# Patient Record
Sex: FEMALE | Race: OTHER | HISPANIC OR LATINO
[De-identification: names, ages, dates, MRNs, and addresses within clinical notes are randomized per-mention and may not be internally consistent; named-entity substitution may affect disease eponyms.]

---

## 2019-10-28 PROBLEM — Z00.00 ENCOUNTER FOR PREVENTIVE HEALTH EXAMINATION: Status: ACTIVE | Noted: 2019-10-28

## 2019-10-29 ENCOUNTER — APPOINTMENT (OUTPATIENT)
Dept: OBGYN | Facility: CLINIC | Age: 29
End: 2019-10-29

## 2021-01-04 ENCOUNTER — NON-APPOINTMENT (OUTPATIENT)
Age: 31
End: 2021-01-04

## 2021-01-04 ENCOUNTER — APPOINTMENT (OUTPATIENT)
Dept: OBGYN | Facility: CLINIC | Age: 31
End: 2021-01-04
Payer: MEDICAID

## 2021-01-04 VITALS — DIASTOLIC BLOOD PRESSURE: 72 MMHG | WEIGHT: 114 LBS | SYSTOLIC BLOOD PRESSURE: 118 MMHG | BODY MASS INDEX: 20.19 KG/M2

## 2021-01-04 VITALS — HEIGHT: 63 IN

## 2021-01-04 PROCEDURE — 99072 ADDL SUPL MATRL&STAF TM PHE: CPT

## 2021-01-04 PROCEDURE — 99203 OFFICE O/P NEW LOW 30 MIN: CPT | Mod: TH

## 2021-01-28 ENCOUNTER — NON-APPOINTMENT (OUTPATIENT)
Age: 31
End: 2021-01-28

## 2021-02-04 ENCOUNTER — LABORATORY RESULT (OUTPATIENT)
Age: 31
End: 2021-02-04

## 2021-02-05 ENCOUNTER — NON-APPOINTMENT (OUTPATIENT)
Age: 31
End: 2021-02-05

## 2021-02-05 ENCOUNTER — APPOINTMENT (OUTPATIENT)
Dept: OBGYN | Facility: CLINIC | Age: 31
End: 2021-02-05
Payer: MEDICAID

## 2021-02-05 VITALS — DIASTOLIC BLOOD PRESSURE: 76 MMHG | WEIGHT: 116 LBS | SYSTOLIC BLOOD PRESSURE: 114 MMHG | BODY MASS INDEX: 20.55 KG/M2

## 2021-02-05 PROCEDURE — 99072 ADDL SUPL MATRL&STAF TM PHE: CPT

## 2021-02-05 PROCEDURE — 99213 OFFICE O/P EST LOW 20 MIN: CPT | Mod: TH

## 2021-02-08 ENCOUNTER — NON-APPOINTMENT (OUTPATIENT)
Age: 31
End: 2021-02-08

## 2021-02-10 ENCOUNTER — APPOINTMENT (OUTPATIENT)
Dept: ANTEPARTUM | Facility: CLINIC | Age: 31
End: 2021-02-10
Payer: MEDICAID

## 2021-02-10 ENCOUNTER — APPOINTMENT (OUTPATIENT)
Dept: ANTEPARTUM | Facility: CLINIC | Age: 31
End: 2021-02-10

## 2021-02-10 ENCOUNTER — ASOB RESULT (OUTPATIENT)
Age: 31
End: 2021-02-10

## 2021-02-10 PROCEDURE — 76805 OB US >/= 14 WKS SNGL FETUS: CPT

## 2021-02-10 PROCEDURE — 99072 ADDL SUPL MATRL&STAF TM PHE: CPT

## 2021-02-17 ENCOUNTER — NON-APPOINTMENT (OUTPATIENT)
Age: 31
End: 2021-02-17

## 2021-02-19 ENCOUNTER — APPOINTMENT (OUTPATIENT)
Dept: OBGYN | Facility: CLINIC | Age: 31
End: 2021-02-19
Payer: MEDICAID

## 2021-02-19 ENCOUNTER — NON-APPOINTMENT (OUTPATIENT)
Age: 31
End: 2021-02-19

## 2021-02-19 VITALS — SYSTOLIC BLOOD PRESSURE: 110 MMHG | BODY MASS INDEX: 21.61 KG/M2 | DIASTOLIC BLOOD PRESSURE: 68 MMHG | WEIGHT: 122 LBS

## 2021-02-19 DIAGNOSIS — Z34.93 ENCOUNTER FOR SUPERVISION OF NORMAL PREGNANCY, UNSPECIFIED, THIRD TRIMESTER: ICD-10-CM

## 2021-02-19 PROCEDURE — 99072 ADDL SUPL MATRL&STAF TM PHE: CPT

## 2021-02-19 PROCEDURE — 99213 OFFICE O/P EST LOW 20 MIN: CPT | Mod: TH

## 2021-03-01 ENCOUNTER — LABORATORY RESULT (OUTPATIENT)
Age: 31
End: 2021-03-01

## 2021-03-02 ENCOUNTER — NON-APPOINTMENT (OUTPATIENT)
Age: 31
End: 2021-03-02

## 2021-03-05 ENCOUNTER — NON-APPOINTMENT (OUTPATIENT)
Age: 31
End: 2021-03-05

## 2021-03-05 ENCOUNTER — APPOINTMENT (OUTPATIENT)
Dept: OBGYN | Facility: CLINIC | Age: 31
End: 2021-03-05
Payer: MEDICAID

## 2021-03-05 VITALS — BODY MASS INDEX: 21.97 KG/M2 | DIASTOLIC BLOOD PRESSURE: 74 MMHG | SYSTOLIC BLOOD PRESSURE: 112 MMHG | WEIGHT: 124 LBS

## 2021-03-05 DIAGNOSIS — D64.9 ANEMIA, UNSPECIFIED: ICD-10-CM

## 2021-03-05 PROCEDURE — 99213 OFFICE O/P EST LOW 20 MIN: CPT | Mod: TH

## 2021-03-05 PROCEDURE — 99072 ADDL SUPL MATRL&STAF TM PHE: CPT

## 2021-03-10 ENCOUNTER — APPOINTMENT (OUTPATIENT)
Dept: ANTEPARTUM | Facility: CLINIC | Age: 31
End: 2021-03-10
Payer: MEDICAID

## 2021-03-10 ENCOUNTER — ASOB RESULT (OUTPATIENT)
Age: 31
End: 2021-03-10

## 2021-03-10 PROCEDURE — 76816 OB US FOLLOW-UP PER FETUS: CPT

## 2021-03-10 PROCEDURE — 99072 ADDL SUPL MATRL&STAF TM PHE: CPT

## 2021-03-16 ENCOUNTER — NON-APPOINTMENT (OUTPATIENT)
Age: 31
End: 2021-03-16

## 2021-03-19 ENCOUNTER — APPOINTMENT (OUTPATIENT)
Dept: OBGYN | Facility: CLINIC | Age: 31
End: 2021-03-19
Payer: MEDICAID

## 2021-03-19 ENCOUNTER — NON-APPOINTMENT (OUTPATIENT)
Age: 31
End: 2021-03-19

## 2021-03-19 VITALS — DIASTOLIC BLOOD PRESSURE: 82 MMHG | BODY MASS INDEX: 21.97 KG/M2 | WEIGHT: 124 LBS | SYSTOLIC BLOOD PRESSURE: 110 MMHG

## 2021-03-19 PROCEDURE — 99213 OFFICE O/P EST LOW 20 MIN: CPT | Mod: TH

## 2021-03-19 PROCEDURE — 99072 ADDL SUPL MATRL&STAF TM PHE: CPT

## 2021-04-01 ENCOUNTER — LABORATORY RESULT (OUTPATIENT)
Age: 31
End: 2021-04-01

## 2021-04-01 ENCOUNTER — NON-APPOINTMENT (OUTPATIENT)
Age: 31
End: 2021-04-01

## 2021-04-01 ENCOUNTER — APPOINTMENT (OUTPATIENT)
Dept: OBGYN | Facility: CLINIC | Age: 31
End: 2021-04-01
Payer: MEDICAID

## 2021-04-01 VITALS — BODY MASS INDEX: 22.14 KG/M2 | WEIGHT: 125 LBS | DIASTOLIC BLOOD PRESSURE: 68 MMHG | SYSTOLIC BLOOD PRESSURE: 112 MMHG

## 2021-04-01 PROCEDURE — 99213 OFFICE O/P EST LOW 20 MIN: CPT | Mod: TH

## 2021-04-01 PROCEDURE — 99072 ADDL SUPL MATRL&STAF TM PHE: CPT

## 2021-04-07 ENCOUNTER — NON-APPOINTMENT (OUTPATIENT)
Age: 31
End: 2021-04-07

## 2021-04-07 ENCOUNTER — APPOINTMENT (OUTPATIENT)
Dept: ANTEPARTUM | Facility: CLINIC | Age: 31
End: 2021-04-07
Payer: MEDICAID

## 2021-04-07 ENCOUNTER — ASOB RESULT (OUTPATIENT)
Age: 31
End: 2021-04-07

## 2021-04-07 VITALS
HEART RATE: 83 BPM | HEIGHT: 63 IN | BODY MASS INDEX: 22.32 KG/M2 | WEIGHT: 126 LBS | SYSTOLIC BLOOD PRESSURE: 112 MMHG | DIASTOLIC BLOOD PRESSURE: 76 MMHG

## 2021-04-07 PROCEDURE — 76820 UMBILICAL ARTERY ECHO: CPT

## 2021-04-07 PROCEDURE — 76816 OB US FOLLOW-UP PER FETUS: CPT

## 2021-04-07 PROCEDURE — 76819 FETAL BIOPHYS PROFIL W/O NST: CPT

## 2021-04-07 PROCEDURE — 99072 ADDL SUPL MATRL&STAF TM PHE: CPT

## 2021-04-09 ENCOUNTER — APPOINTMENT (OUTPATIENT)
Dept: ANTEPARTUM | Facility: CLINIC | Age: 31
End: 2021-04-09
Payer: MEDICAID

## 2021-04-09 ENCOUNTER — ASOB RESULT (OUTPATIENT)
Age: 31
End: 2021-04-09

## 2021-04-09 ENCOUNTER — NON-APPOINTMENT (OUTPATIENT)
Age: 31
End: 2021-04-09

## 2021-04-09 ENCOUNTER — APPOINTMENT (OUTPATIENT)
Dept: OBGYN | Facility: CLINIC | Age: 31
End: 2021-04-09
Payer: MEDICAID

## 2021-04-09 VITALS — BODY MASS INDEX: 22.67 KG/M2 | WEIGHT: 128 LBS

## 2021-04-09 PROCEDURE — 99213 OFFICE O/P EST LOW 20 MIN: CPT

## 2021-04-09 PROCEDURE — 76818 FETAL BIOPHYS PROFILE W/NST: CPT

## 2021-04-09 PROCEDURE — 99072 ADDL SUPL MATRL&STAF TM PHE: CPT

## 2021-04-13 ENCOUNTER — APPOINTMENT (OUTPATIENT)
Dept: ANTEPARTUM | Facility: CLINIC | Age: 31
End: 2021-04-13

## 2021-04-13 ENCOUNTER — ASOB RESULT (OUTPATIENT)
Age: 31
End: 2021-04-13

## 2021-04-13 ENCOUNTER — APPOINTMENT (OUTPATIENT)
Dept: ANTEPARTUM | Facility: CLINIC | Age: 31
End: 2021-04-13
Payer: MEDICAID

## 2021-04-13 PROCEDURE — 99072 ADDL SUPL MATRL&STAF TM PHE: CPT

## 2021-04-13 PROCEDURE — 76818 FETAL BIOPHYS PROFILE W/NST: CPT

## 2021-04-16 ENCOUNTER — ASOB RESULT (OUTPATIENT)
Age: 31
End: 2021-04-16

## 2021-04-16 ENCOUNTER — APPOINTMENT (OUTPATIENT)
Dept: ANTEPARTUM | Facility: CLINIC | Age: 31
End: 2021-04-16
Payer: MEDICAID

## 2021-04-16 PROCEDURE — 76820 UMBILICAL ARTERY ECHO: CPT

## 2021-04-16 PROCEDURE — 76819 FETAL BIOPHYS PROFIL W/O NST: CPT

## 2021-04-16 PROCEDURE — 99072 ADDL SUPL MATRL&STAF TM PHE: CPT

## 2021-04-19 ENCOUNTER — INPATIENT (INPATIENT)
Facility: HOSPITAL | Age: 31
LOS: 2 days | Discharge: HOME | End: 2021-04-22
Attending: OBSTETRICS & GYNECOLOGY | Admitting: OBSTETRICS & GYNECOLOGY
Payer: MEDICAID

## 2021-04-19 VITALS — WEIGHT: 127.87 LBS | HEIGHT: 63 IN

## 2021-04-19 DIAGNOSIS — O26.899 OTHER SPECIFIED PREGNANCY RELATED CONDITIONS, UNSPECIFIED TRIMESTER: ICD-10-CM

## 2021-04-19 DIAGNOSIS — Z3A.00 WEEKS OF GESTATION OF PREGNANCY NOT SPECIFIED: ICD-10-CM

## 2021-04-19 DIAGNOSIS — Z34.80 ENCOUNTER FOR SUPERVISION OF OTHER NORMAL PREGNANCY, UNSPECIFIED TRIMESTER: ICD-10-CM

## 2021-04-19 LAB
APTT BLD: 23.6 SEC — LOW (ref 27.5–35.5)
BASOPHILS # BLD AUTO: 0.08 K/UL — SIGNIFICANT CHANGE UP (ref 0–0.2)
BASOPHILS NFR BLD AUTO: 0.9 % — SIGNIFICANT CHANGE UP (ref 0–2)
BLD GP AB SCN SERPL QL: SIGNIFICANT CHANGE UP
COVID-19 SPIKE DOMAIN AB INTERP: NEGATIVE — SIGNIFICANT CHANGE UP
COVID-19 SPIKE DOMAIN ANTIBODY RESULT: 0.4 U/ML — SIGNIFICANT CHANGE UP
EOSINOPHIL # BLD AUTO: 0.32 K/UL — SIGNIFICANT CHANGE UP (ref 0–0.5)
EOSINOPHIL NFR BLD AUTO: 3.6 % — SIGNIFICANT CHANGE UP (ref 0–6)
FIBRINOGEN PPP-MCNC: 548 MG/DL — HIGH (ref 290–520)
HCT VFR BLD CALC: 36.9 % — SIGNIFICANT CHANGE UP (ref 34.5–45)
HGB BLD-MCNC: 12.5 G/DL — SIGNIFICANT CHANGE UP (ref 11.5–15.5)
IMM GRANULOCYTES NFR BLD AUTO: 0.8 % — SIGNIFICANT CHANGE UP (ref 0–1.5)
INR BLD: 0.98 RATIO — SIGNIFICANT CHANGE UP (ref 0.88–1.16)
LYMPHOCYTES # BLD AUTO: 1.87 K/UL — SIGNIFICANT CHANGE UP (ref 1–3.3)
LYMPHOCYTES # BLD AUTO: 20.9 % — SIGNIFICANT CHANGE UP (ref 13–44)
MCHC RBC-ENTMCNC: 29.9 PG — SIGNIFICANT CHANGE UP (ref 27–34)
MCHC RBC-ENTMCNC: 33.9 GM/DL — SIGNIFICANT CHANGE UP (ref 32–36)
MCV RBC AUTO: 88.3 FL — SIGNIFICANT CHANGE UP (ref 80–100)
MONOCYTES # BLD AUTO: 0.6 K/UL — SIGNIFICANT CHANGE UP (ref 0–0.9)
MONOCYTES NFR BLD AUTO: 6.7 % — SIGNIFICANT CHANGE UP (ref 2–14)
NEUTROPHILS # BLD AUTO: 6.01 K/UL — SIGNIFICANT CHANGE UP (ref 1.8–7.4)
NEUTROPHILS NFR BLD AUTO: 67.1 % — SIGNIFICANT CHANGE UP (ref 43–77)
NRBC # BLD: 0 /100 WBCS — SIGNIFICANT CHANGE UP (ref 0–0)
PLATELET # BLD AUTO: 275 K/UL — SIGNIFICANT CHANGE UP (ref 150–400)
PROTHROM AB SERPL-ACNC: 11.7 SEC — SIGNIFICANT CHANGE UP (ref 10.6–13.6)
RBC # BLD: 4.18 M/UL — SIGNIFICANT CHANGE UP (ref 3.8–5.2)
RBC # FLD: 14.1 % — SIGNIFICANT CHANGE UP (ref 10.3–14.5)
RUBV IGG SER-ACNC: 23.4 INDEX — SIGNIFICANT CHANGE UP
RUBV IGG SER-IMP: POSITIVE — SIGNIFICANT CHANGE UP
SARS-COV-2 IGG+IGM SERPL QL IA: 0.4 U/ML — SIGNIFICANT CHANGE UP
SARS-COV-2 IGG+IGM SERPL QL IA: NEGATIVE — SIGNIFICANT CHANGE UP
SARS-COV-2 RNA SPEC QL NAA+PROBE: SIGNIFICANT CHANGE UP
T PALLIDUM AB TITR SER: NEGATIVE — SIGNIFICANT CHANGE UP
WBC # BLD: 8.95 K/UL — SIGNIFICANT CHANGE UP (ref 3.8–10.5)
WBC # FLD AUTO: 8.95 K/UL — SIGNIFICANT CHANGE UP (ref 3.8–10.5)

## 2021-04-19 PROCEDURE — 59514 CESAREAN DELIVERY ONLY: CPT | Mod: U9

## 2021-04-19 RX ORDER — SODIUM CHLORIDE 9 MG/ML
1000 INJECTION, SOLUTION INTRAVENOUS
Refills: 0 | Status: DISCONTINUED | OUTPATIENT
Start: 2021-04-19 | End: 2021-04-20

## 2021-04-19 RX ORDER — CITRIC ACID/SODIUM CITRATE 300-500 MG
15 SOLUTION, ORAL ORAL EVERY 6 HOURS
Refills: 0 | Status: DISCONTINUED | OUTPATIENT
Start: 2021-04-19 | End: 2021-04-20

## 2021-04-19 RX ADMIN — SODIUM CHLORIDE 125 MILLILITER(S): 9 INJECTION, SOLUTION INTRAVENOUS at 11:58

## 2021-04-19 RX ADMIN — SODIUM CHLORIDE 125 MILLILITER(S): 9 INJECTION, SOLUTION INTRAVENOUS at 18:41

## 2021-04-20 LAB
ABO RH CONFIRMATION: SIGNIFICANT CHANGE UP
HBV SURFACE AG SERPL QL IA: SIGNIFICANT CHANGE UP

## 2021-04-20 RX ORDER — LANOLIN
1 OINTMENT (GRAM) TOPICAL EVERY 6 HOURS
Refills: 0 | Status: DISCONTINUED | OUTPATIENT
Start: 2021-04-20 | End: 2021-04-22

## 2021-04-20 RX ORDER — FERROUS SULFATE 325(65) MG
325 TABLET ORAL DAILY
Refills: 0 | Status: DISCONTINUED | OUTPATIENT
Start: 2021-04-21 | End: 2021-04-22

## 2021-04-20 RX ORDER — OXYCODONE HYDROCHLORIDE 5 MG/1
5 TABLET ORAL
Refills: 0 | Status: DISCONTINUED | OUTPATIENT
Start: 2021-04-20 | End: 2021-04-22

## 2021-04-20 RX ORDER — OXYTOCIN 10 UNIT/ML
VIAL (ML) INJECTION
Qty: 30 | Refills: 0 | Status: DISCONTINUED | OUTPATIENT
Start: 2021-04-20 | End: 2021-04-20

## 2021-04-20 RX ORDER — CEFOTETAN DISODIUM 1 G
2 VIAL (EA) INJECTION ONCE
Refills: 0 | Status: COMPLETED | OUTPATIENT
Start: 2021-04-20 | End: 2021-04-20

## 2021-04-20 RX ORDER — DIPHENHYDRAMINE HCL 50 MG
25 CAPSULE ORAL EVERY 6 HOURS
Refills: 0 | Status: COMPLETED | OUTPATIENT
Start: 2021-04-20 | End: 2022-03-19

## 2021-04-20 RX ORDER — SODIUM CHLORIDE 9 MG/ML
1000 INJECTION, SOLUTION INTRAVENOUS
Refills: 0 | Status: DISCONTINUED | OUTPATIENT
Start: 2021-04-20 | End: 2021-04-22

## 2021-04-20 RX ORDER — CITRIC ACID/SODIUM CITRATE 300-500 MG
30 SOLUTION, ORAL ORAL ONCE
Refills: 0 | Status: DISCONTINUED | OUTPATIENT
Start: 2021-04-20 | End: 2021-04-20

## 2021-04-20 RX ORDER — OXYTOCIN 10 UNIT/ML
333.33 VIAL (ML) INJECTION
Qty: 20 | Refills: 0 | Status: DISCONTINUED | OUTPATIENT
Start: 2021-04-20 | End: 2021-04-22

## 2021-04-20 RX ORDER — HEPARIN SODIUM 5000 [USP'U]/ML
5000 INJECTION INTRAVENOUS; SUBCUTANEOUS EVERY 12 HOURS
Refills: 0 | Status: DISCONTINUED | OUTPATIENT
Start: 2021-04-21 | End: 2021-04-22

## 2021-04-20 RX ORDER — ACETAMINOPHEN 500 MG
1000 TABLET ORAL ONCE
Refills: 0 | Status: COMPLETED | OUTPATIENT
Start: 2021-04-20 | End: 2021-04-20

## 2021-04-20 RX ORDER — KETOROLAC TROMETHAMINE 30 MG/ML
30 SYRINGE (ML) INJECTION ONCE
Refills: 0 | Status: DISCONTINUED | OUTPATIENT
Start: 2021-04-20 | End: 2021-04-22

## 2021-04-20 RX ORDER — SODIUM CHLORIDE 9 MG/ML
500 INJECTION INTRAMUSCULAR; INTRAVENOUS; SUBCUTANEOUS ONCE
Refills: 0 | Status: COMPLETED | OUTPATIENT
Start: 2021-04-20 | End: 2021-04-20

## 2021-04-20 RX ORDER — MAGNESIUM HYDROXIDE 400 MG/1
30 TABLET, CHEWABLE ORAL
Refills: 0 | Status: DISCONTINUED | OUTPATIENT
Start: 2021-04-20 | End: 2021-04-22

## 2021-04-20 RX ORDER — ACETAMINOPHEN 500 MG
975 TABLET ORAL
Refills: 0 | Status: DISCONTINUED | OUTPATIENT
Start: 2021-04-20 | End: 2021-04-22

## 2021-04-20 RX ORDER — TETANUS TOXOID, REDUCED DIPHTHERIA TOXOID AND ACELLULAR PERTUSSIS VACCINE, ADSORBED 5; 2.5; 8; 8; 2.5 [IU]/.5ML; [IU]/.5ML; UG/.5ML; UG/.5ML; UG/.5ML
0.5 SUSPENSION INTRAMUSCULAR ONCE
Refills: 0 | Status: COMPLETED | OUTPATIENT
Start: 2021-04-20

## 2021-04-20 RX ORDER — OXYTOCIN 10 UNIT/ML
4 VIAL (ML) INJECTION
Qty: 30 | Refills: 0 | Status: DISCONTINUED | OUTPATIENT
Start: 2021-04-20 | End: 2021-04-20

## 2021-04-20 RX ORDER — SIMETHICONE 80 MG/1
80 TABLET, CHEWABLE ORAL EVERY 4 HOURS
Refills: 0 | Status: DISCONTINUED | OUTPATIENT
Start: 2021-04-20 | End: 2021-04-22

## 2021-04-20 RX ORDER — IBUPROFEN 200 MG
600 TABLET ORAL EVERY 6 HOURS
Refills: 0 | Status: COMPLETED | OUTPATIENT
Start: 2021-04-20 | End: 2022-03-19

## 2021-04-20 RX ORDER — KETOROLAC TROMETHAMINE 30 MG/ML
30 SYRINGE (ML) INJECTION EVERY 6 HOURS
Refills: 0 | Status: DISCONTINUED | OUTPATIENT
Start: 2021-04-20 | End: 2021-04-21

## 2021-04-20 RX ORDER — MORPHINE SULFATE 50 MG/1
3 CAPSULE, EXTENDED RELEASE ORAL ONCE
Refills: 0 | Status: DISCONTINUED | OUTPATIENT
Start: 2021-04-20 | End: 2021-04-22

## 2021-04-20 RX ORDER — DIPHENHYDRAMINE HCL 50 MG
25 CAPSULE ORAL EVERY 6 HOURS
Refills: 0 | Status: DISCONTINUED | OUTPATIENT
Start: 2021-04-20 | End: 2021-04-22

## 2021-04-20 RX ORDER — AZITHROMYCIN 500 MG/1
500 TABLET, FILM COATED ORAL ONCE
Refills: 0 | Status: COMPLETED | OUTPATIENT
Start: 2021-04-20 | End: 2021-04-20

## 2021-04-20 RX ADMIN — Medication 30 MILLIGRAM(S): at 19:09

## 2021-04-20 RX ADMIN — Medication 400 MILLIGRAM(S): at 15:58

## 2021-04-20 RX ADMIN — SODIUM CHLORIDE 500 MILLILITER(S): 9 INJECTION INTRAMUSCULAR; INTRAVENOUS; SUBCUTANEOUS at 13:58

## 2021-04-20 RX ADMIN — Medication 4 MILLIUNIT(S)/MIN: at 11:32

## 2021-04-20 RX ADMIN — Medication 25 MILLIGRAM(S): at 22:14

## 2021-04-20 RX ADMIN — SIMETHICONE 80 MILLIGRAM(S): 80 TABLET, CHEWABLE ORAL at 22:17

## 2021-04-20 RX ADMIN — Medication 1000 MILLIUNIT(S)/MIN: at 12:22

## 2021-04-20 RX ADMIN — AZITHROMYCIN 255 MILLIGRAM(S): 500 TABLET, FILM COATED ORAL at 12:53

## 2021-04-20 RX ADMIN — SODIUM CHLORIDE 125 MILLILITER(S): 9 INJECTION, SOLUTION INTRAVENOUS at 15:07

## 2021-04-20 RX ADMIN — Medication 1000 MILLIGRAM(S): at 17:00

## 2021-04-20 RX ADMIN — Medication 100 GRAM(S): at 12:09

## 2021-04-20 RX ADMIN — Medication 30 MILLIGRAM(S): at 20:00

## 2021-04-21 ENCOUNTER — TRANSCRIPTION ENCOUNTER (OUTPATIENT)
Age: 31
End: 2021-04-21

## 2021-04-21 LAB
ANISOCYTOSIS BLD QL: SLIGHT — SIGNIFICANT CHANGE UP
BASOPHILS # BLD AUTO: 0 K/UL — SIGNIFICANT CHANGE UP (ref 0–0.2)
BASOPHILS NFR BLD AUTO: 0 % — SIGNIFICANT CHANGE UP (ref 0–2)
EOSINOPHIL # BLD AUTO: 0 K/UL — SIGNIFICANT CHANGE UP (ref 0–0.5)
EOSINOPHIL NFR BLD AUTO: 0 % — SIGNIFICANT CHANGE UP (ref 0–6)
HCT VFR BLD CALC: 36.1 % — SIGNIFICANT CHANGE UP (ref 34.5–45)
HGB BLD-MCNC: 12.4 G/DL — SIGNIFICANT CHANGE UP (ref 11.5–15.5)
HYPOCHROMIA BLD QL: SLIGHT — SIGNIFICANT CHANGE UP
LYMPHOCYTES # BLD AUTO: 1.46 K/UL — SIGNIFICANT CHANGE UP (ref 1–3.3)
LYMPHOCYTES # BLD AUTO: 6 % — LOW (ref 13–44)
MANUAL SMEAR VERIFICATION: SIGNIFICANT CHANGE UP
MCHC RBC-ENTMCNC: 30.5 PG — SIGNIFICANT CHANGE UP (ref 27–34)
MCHC RBC-ENTMCNC: 34.3 GM/DL — SIGNIFICANT CHANGE UP (ref 32–36)
MCV RBC AUTO: 88.7 FL — SIGNIFICANT CHANGE UP (ref 80–100)
MONOCYTES # BLD AUTO: 0 K/UL — SIGNIFICANT CHANGE UP (ref 0–0.9)
MONOCYTES NFR BLD AUTO: 0 % — LOW (ref 2–14)
NEUTROPHILS # BLD AUTO: 22.2 K/UL — HIGH (ref 1.8–7.4)
NEUTROPHILS NFR BLD AUTO: 91 % — HIGH (ref 43–77)
NRBC # BLD: 0 /100 — SIGNIFICANT CHANGE UP (ref 0–0)
PLAT MORPH BLD: NORMAL — SIGNIFICANT CHANGE UP
PLATELET # BLD AUTO: 272 K/UL — SIGNIFICANT CHANGE UP (ref 150–400)
PLATELET COUNT - ESTIMATE: NORMAL — SIGNIFICANT CHANGE UP
POIKILOCYTOSIS BLD QL AUTO: SLIGHT — SIGNIFICANT CHANGE UP
RBC # BLD: 4.07 M/UL — SIGNIFICANT CHANGE UP (ref 3.8–5.2)
RBC # FLD: 14.5 % — SIGNIFICANT CHANGE UP (ref 10.3–14.5)
RBC BLD AUTO: ABNORMAL
VARIANT LYMPHS # BLD: 3 % — SIGNIFICANT CHANGE UP (ref 0–6)
WBC # BLD: 24.4 K/UL — HIGH (ref 3.8–10.5)
WBC # FLD AUTO: 24.4 K/UL — HIGH (ref 3.8–10.5)

## 2021-04-21 RX ORDER — IBUPROFEN 200 MG
600 TABLET ORAL EVERY 6 HOURS
Refills: 0 | Status: DISCONTINUED | OUTPATIENT
Start: 2021-04-21 | End: 2021-04-22

## 2021-04-21 RX ORDER — TETANUS TOXOID, REDUCED DIPHTHERIA TOXOID AND ACELLULAR PERTUSSIS VACCINE, ADSORBED 5; 2.5; 8; 8; 2.5 [IU]/.5ML; [IU]/.5ML; UG/.5ML; UG/.5ML; UG/.5ML
0.5 SUSPENSION INTRAMUSCULAR ONCE
Refills: 0 | Status: COMPLETED | OUTPATIENT
Start: 2021-04-21 | End: 2021-04-21

## 2021-04-21 RX ADMIN — Medication 975 MILLIGRAM(S): at 17:33

## 2021-04-21 RX ADMIN — Medication 975 MILLIGRAM(S): at 22:02

## 2021-04-21 RX ADMIN — TETANUS TOXOID, REDUCED DIPHTHERIA TOXOID AND ACELLULAR PERTUSSIS VACCINE, ADSORBED 0.5 MILLILITER(S): 5; 2.5; 8; 8; 2.5 SUSPENSION INTRAMUSCULAR at 21:32

## 2021-04-21 RX ADMIN — MAGNESIUM HYDROXIDE 30 MILLILITER(S): 400 TABLET, CHEWABLE ORAL at 21:31

## 2021-04-21 RX ADMIN — Medication 30 MILLIGRAM(S): at 01:20

## 2021-04-21 RX ADMIN — Medication 30 MILLIGRAM(S): at 13:56

## 2021-04-21 RX ADMIN — Medication 30 MILLIGRAM(S): at 06:40

## 2021-04-21 RX ADMIN — SIMETHICONE 80 MILLIGRAM(S): 80 TABLET, CHEWABLE ORAL at 09:56

## 2021-04-21 RX ADMIN — Medication 325 MILLIGRAM(S): at 12:13

## 2021-04-21 RX ADMIN — Medication 975 MILLIGRAM(S): at 21:31

## 2021-04-21 RX ADMIN — Medication 30 MILLIGRAM(S): at 00:47

## 2021-04-21 RX ADMIN — Medication 1 TABLET(S): at 12:13

## 2021-04-21 RX ADMIN — HEPARIN SODIUM 5000 UNIT(S): 5000 INJECTION INTRAVENOUS; SUBCUTANEOUS at 13:56

## 2021-04-21 RX ADMIN — HEPARIN SODIUM 5000 UNIT(S): 5000 INJECTION INTRAVENOUS; SUBCUTANEOUS at 02:48

## 2021-04-21 RX ADMIN — Medication 975 MILLIGRAM(S): at 17:02

## 2021-04-21 RX ADMIN — SIMETHICONE 80 MILLIGRAM(S): 80 TABLET, CHEWABLE ORAL at 21:29

## 2021-04-21 RX ADMIN — Medication 30 MILLIGRAM(S): at 07:15

## 2021-04-21 RX ADMIN — Medication 30 MILLIGRAM(S): at 14:17

## 2021-04-21 NOTE — LACTATION INITIAL EVALUATION - LACTATION INTERVENTIONS
Risks of supplementation on breastfeeding success discussed as well as risks of pacifier use; Breastfeeding on cue 8-12X/24 hours with diaper count to assess for adequate intake, safe skin to skin and rooming-in encouraged./initiate/review early breastfeeding management guidelines/initiate/review techniques for position and latch/review techniques to increase milk supply

## 2021-04-22 VITALS
OXYGEN SATURATION: 100 % | SYSTOLIC BLOOD PRESSURE: 113 MMHG | HEART RATE: 73 BPM | TEMPERATURE: 98 F | DIASTOLIC BLOOD PRESSURE: 77 MMHG | RESPIRATION RATE: 18 BRPM

## 2021-04-22 LAB
BASOPHILS # BLD AUTO: 0.05 K/UL — SIGNIFICANT CHANGE UP (ref 0–0.2)
BASOPHILS NFR BLD AUTO: 0.3 % — SIGNIFICANT CHANGE UP (ref 0–2)
EOSINOPHIL # BLD AUTO: 0.07 K/UL — SIGNIFICANT CHANGE UP (ref 0–0.5)
EOSINOPHIL NFR BLD AUTO: 0.5 % — SIGNIFICANT CHANGE UP (ref 0–6)
HCT VFR BLD CALC: 33.2 % — LOW (ref 34.5–45)
HGB BLD-MCNC: 11.1 G/DL — LOW (ref 11.5–15.5)
IMM GRANULOCYTES NFR BLD AUTO: 1.3 % — SIGNIFICANT CHANGE UP (ref 0–1.5)
LYMPHOCYTES # BLD AUTO: 18.4 % — SIGNIFICANT CHANGE UP (ref 13–44)
LYMPHOCYTES # BLD AUTO: 2.64 K/UL — SIGNIFICANT CHANGE UP (ref 1–3.3)
MCHC RBC-ENTMCNC: 30.1 PG — SIGNIFICANT CHANGE UP (ref 27–34)
MCHC RBC-ENTMCNC: 33.4 GM/DL — SIGNIFICANT CHANGE UP (ref 32–36)
MCV RBC AUTO: 90 FL — SIGNIFICANT CHANGE UP (ref 80–100)
MONOCYTES # BLD AUTO: 0.78 K/UL — SIGNIFICANT CHANGE UP (ref 0–0.9)
MONOCYTES NFR BLD AUTO: 5.4 % — SIGNIFICANT CHANGE UP (ref 2–14)
NEUTROPHILS # BLD AUTO: 10.64 K/UL — HIGH (ref 1.8–7.4)
NEUTROPHILS NFR BLD AUTO: 74.1 % — SIGNIFICANT CHANGE UP (ref 43–77)
NRBC # BLD: 0 /100 WBCS — SIGNIFICANT CHANGE UP (ref 0–0)
PLATELET # BLD AUTO: 255 K/UL — SIGNIFICANT CHANGE UP (ref 150–400)
RBC # BLD: 3.69 M/UL — LOW (ref 3.8–5.2)
RBC # FLD: 15.2 % — HIGH (ref 10.3–14.5)
WBC # BLD: 14.37 K/UL — HIGH (ref 3.8–10.5)
WBC # FLD AUTO: 14.37 K/UL — HIGH (ref 3.8–10.5)

## 2021-04-22 PROCEDURE — 86900 BLOOD TYPING SEROLOGIC ABO: CPT

## 2021-04-22 PROCEDURE — 87635 SARS-COV-2 COVID-19 AMP PRB: CPT

## 2021-04-22 PROCEDURE — G0463: CPT

## 2021-04-22 PROCEDURE — 87340 HEPATITIS B SURFACE AG IA: CPT

## 2021-04-22 PROCEDURE — 86923 COMPATIBILITY TEST ELECTRIC: CPT

## 2021-04-22 PROCEDURE — 86762 RUBELLA ANTIBODY: CPT

## 2021-04-22 PROCEDURE — 90715 TDAP VACCINE 7 YRS/> IM: CPT

## 2021-04-22 PROCEDURE — 36415 COLL VENOUS BLD VENIPUNCTURE: CPT

## 2021-04-22 PROCEDURE — 85025 COMPLETE CBC W/AUTO DIFF WBC: CPT

## 2021-04-22 PROCEDURE — 85610 PROTHROMBIN TIME: CPT

## 2021-04-22 PROCEDURE — 86780 TREPONEMA PALLIDUM: CPT

## 2021-04-22 PROCEDURE — 86901 BLOOD TYPING SEROLOGIC RH(D): CPT

## 2021-04-22 PROCEDURE — 59025 FETAL NON-STRESS TEST: CPT

## 2021-04-22 PROCEDURE — 86850 RBC ANTIBODY SCREEN: CPT

## 2021-04-22 PROCEDURE — 86769 SARS-COV-2 COVID-19 ANTIBODY: CPT

## 2021-04-22 PROCEDURE — 59050 FETAL MONITOR W/REPORT: CPT

## 2021-04-22 PROCEDURE — 85384 FIBRINOGEN ACTIVITY: CPT

## 2021-04-22 PROCEDURE — 85730 THROMBOPLASTIN TIME PARTIAL: CPT

## 2021-04-22 RX ORDER — DOCUSATE SODIUM 100 MG
1 CAPSULE ORAL
Qty: 30 | Refills: 0
Start: 2021-04-22

## 2021-04-22 RX ORDER — ACETAMINOPHEN 500 MG
3 TABLET ORAL
Qty: 0 | Refills: 0 | DISCHARGE
Start: 2021-04-22

## 2021-04-22 RX ORDER — IBUPROFEN 200 MG
1 TABLET ORAL
Qty: 30 | Refills: 0
Start: 2021-04-22

## 2021-04-22 RX ADMIN — Medication 975 MILLIGRAM(S): at 05:54

## 2021-04-22 RX ADMIN — Medication 600 MILLIGRAM(S): at 11:10

## 2021-04-22 RX ADMIN — Medication 600 MILLIGRAM(S): at 02:04

## 2021-04-22 RX ADMIN — Medication 600 MILLIGRAM(S): at 01:27

## 2021-04-22 RX ADMIN — Medication 975 MILLIGRAM(S): at 06:30

## 2021-04-22 RX ADMIN — Medication 600 MILLIGRAM(S): at 10:51

## 2021-04-22 RX ADMIN — HEPARIN SODIUM 5000 UNIT(S): 5000 INJECTION INTRAVENOUS; SUBCUTANEOUS at 01:26

## 2021-04-22 NOTE — DISCHARGE NOTE OB - MATERIALS PROVIDED
[place]acc[place]  Scre[place]/  Immunization Record/Guide to Postpartum Care/Back To Sleep Handout/Shaken Baby Prevention Handout/Breastfeeding Guide and Packet/Birth Certificate Instructions/Discharge Medication Information for Patients and Families Pocket Guide

## 2021-04-22 NOTE — DISCHARGE NOTE OB - HOSPITAL COURSE
Patient is a 31 year old  at 39w2d who presents for mIOL for IUGR.  S/p Primary c/s for Cat II tracing.  Uncomplicated procedure and routine post op care.

## 2021-04-22 NOTE — DISCHARGE NOTE OB - CARE PLAN
Principal Discharge DX:	 delivery delivered  Goal:	Pain management and breastfeeding  Assessment and plan of treatment:	Continue breastfeeding.  Motrin as needed for pain.  Ambulate daily.  No heavy lifting or anything per vagina x 6 weeks - no sex, tampons, douching, tub baths, etc.  Follow up in office in 2 weeks for incision check, and then at 6 weeks for postpartum check.

## 2021-04-22 NOTE — DISCHARGE NOTE OB - CARE PROVIDER_API CALL
Juan C Mcgrath  OBSTETRICS AND GYNECOLOGY  [address], [place] [address]  Phone: [phone]  Fax: [phone]  Follow Up Time:

## 2021-04-22 NOTE — DISCHARGE NOTE OB - MEDICATION SUMMARY - MEDICATIONS TO TAKE
I will START or STAY ON the medications listed below when I get home from the hospital:    acetaminophen 325 mg oral tablet  -- 3 tab(s) by mouth , As needed, Mild Pain (1 - 3)  -- Indication: For as needed for pain    ibuprofen 600 mg oral tablet  -- 1 tab(s) by mouth every 6 hours, As needed, Moderate Pain (4 - 6)  -- Indication: For as needed for pain    Prenatal Multivitamins with Folic Acid 1 mg oral tablet  -- 1 tab(s) by mouth once a day  -- Indication: For breastfeeding    Colace 100 mg oral capsule  -- 1 cap(s) by mouth 2 times a day   -- Medication should be taken with plenty of water.    -- Indication: For Stool softener/constipation

## 2021-04-22 NOTE — DISCHARGE NOTE OB - PATIENT PORTAL LINK FT
You can access the FollowMyHealth Patient Portal offered by [facility] by registering at the following website: [email or web address]. By joining [employer]’s FollowMyHealth portal, you will also be able to view your health information using other applications (apps) compatible with our system.

## 2021-04-22 NOTE — PROGRESS NOTE ADULT - ASSESSMENT
A/P: POD #2 s/p c/s   discharge instructions  d/c home  -pt seen and evaluated at bedside by dr. hussain han  
A/P: POD #1 s/p c/s at 39w2d covid neg

## 2021-04-22 NOTE — PROGRESS NOTE ADULT - PROBLEM SELECTOR PLAN 1
-Pain management as needed  -OOB and ambulate  -f/u Rpt CBC   -f/u void  -Advance diet to regular  -Encourage breastfeeding   -d/w Dr. Jose
A/P: POD #2 s/p c/s   discharge instructions  d/c home  -pt seen and evaluated at bedside by dr. hussain han

## 2021-04-22 NOTE — PROGRESS NOTE ADULT - SUBJECTIVE AND OBJECTIVE BOX
Pt seen at bedside offers no complaints. Denies n/v, CP; SOB; palpitations; or dizziness    T(C): 36.7 (04-20-21 @ 16:58), Max: 36.7 (04-20-21 @ 16:58)  HR: 97 (04-20-21 @ 16:58) (80 - 109)  BP: 102/67 (04-20-21 @ 16:58) (77/43 - 124/91)  RR: 17 (04-20-21 @ 16:58) (17 - 24)  SpO2: 97% (04-20-21 @ 16:58) (97% - 100%)    abd: soft/nt, fundus firm, dressing in place C/D/I  pelvic: minimal lochia  ext: venodynes in place; no calf pain    A/P: POD #0 s/p primary c/s 39.2wks   cont close monitor   cont post op care  d/w Dr. Mcgrath
Patient seen at bedisde resting comfortably offers no new complaints. + Ambulation, + void without difficulty, + flatus; tolerating regular diet. both breastfeeding and bottle feeding. Denies HA, CP, SOB, N/V/D,  no bm; dizziness, palpitations, worsening abdominal pain, worsening vaginal bleeding, or any other concerns.   Vital Signs Last 24 Hrs  T(C): 36.7 (22 Apr 2021 06:17), Max: 36.8 (21 Apr 2021 10:10)  T(F): 98 (22 Apr 2021 06:17), Max: 98.3 (21 Apr 2021 13:29)  HR: 73 (22 Apr 2021 06:17) (73 - 97)  BP: 113/77 (22 Apr 2021 06:17) (96/62 - 113/77)  BP(mean): --  RR: 18 (22 Apr 2021 06:17) (16 - 18)  SpO2: 100% (22 Apr 2021 06:17) (99% - 100%)    Gen: A&O x 3, NAD  Chest: CTABL  Cardiac: S1+S2+ RRR  Breast: Soft, nontender, nonengorged  Abdomen: +BS; soft; Nontender, nondistended, dressing removed Incision C/D/I steri strips in place   Gyn: Minimal lochia  Extremities: Nontender, DTRS 2+, no worsening edema                        11.1   14.37 )-----------( 255      ( 22 Apr 2021 07:03 )             33.2       A/P: POD #2 s/p c/s   discharge instructions  d/c home  -pt seen and evaluated at bedside by dr. hussain han
Patient seen at bedside resting comfortably, offers no new complaints. Not yet ambulating, browning just removed no spontaneous voiding yet. + flatus;  no BM; tolerating clear liquid diet. Both breast and bottle feeding. Denies HA, blurry vision or epigastric pain, CP, SOB, N/V/D,  dizziness, palpitations, worsening vaginal bleeding.    Vital Signs Last 24 Hrs  T(C): 36.8 (21 Apr 2021 10:10), Max: 36.8 (21 Apr 2021 10:10)  T(F): 98.2 (21 Apr 2021 10:10), Max: 98.2 (21 Apr 2021 10:10)  HR: 92 (21 Apr 2021 10:10) (80 - 109)  BP: 96/62 (21 Apr 2021 10:10) (77/43 - 124/91)  BP(mean): 72 (20 Apr 2021 16:00) (52 - 100)  RR: 16 (21 Apr 2021 10:10) (16 - 24)  SpO2: 100% (21 Apr 2021 10:10) (97% - 100%)    Gen: A&O x 3, NAD  Chest: CTA B/L  Cardiac: S1,S2  RRR  Breast: Soft, nontender, nonengorged  Abdomen: +BS; soft; Nontender, nondistended; dressing removed incision C/D/I steri strips in place  Gyn: minimal lochia   Extremities: Nontender, venodynes in place                          12.4   24.40 )-----------( 272      ( 21 Apr 2021 07:18 )             36.1

## 2021-04-30 ENCOUNTER — APPOINTMENT (OUTPATIENT)
Dept: OBGYN | Facility: CLINIC | Age: 31
End: 2021-04-30

## 2021-04-30 ENCOUNTER — APPOINTMENT (OUTPATIENT)
Dept: OBGYN | Facility: CLINIC | Age: 31
End: 2021-04-30
Payer: MEDICAID

## 2021-04-30 VITALS — WEIGHT: 111 LBS | SYSTOLIC BLOOD PRESSURE: 110 MMHG | BODY MASS INDEX: 19.66 KG/M2 | DIASTOLIC BLOOD PRESSURE: 70 MMHG

## 2021-04-30 PROCEDURE — 99072 ADDL SUPL MATRL&STAF TM PHE: CPT

## 2021-04-30 PROCEDURE — 99213 OFFICE O/P EST LOW 20 MIN: CPT

## 2021-04-30 NOTE — HISTORY OF PRESENT ILLNESS
[Primary C/S] : delivered by  section [Female] : Delivery History: baby girl [Breastfeeding] : currently nursing [Clean/Dry/Intact] : clean, dry and intact [Healed] : healed [Back to Normal] : is back to normal in size [None] : no vaginal bleeding [Not Done] : Examination of breasts not done [Doing Well] : is doing well [No Sign of Infection] : is showing no signs of infection [Excellent Pain Control] : has excellent pain control [FreeTextEntry8] : Patient seen s/p primary c/s.  States feels well.  No complaints.  Bonding well with baby.  + breastfeeding and formula feeding.  Reports no vaginal bleeding.   [FreeTextEntry9] : IUGR [de-identified] : S/p primary c/s.  [de-identified] : RTO in 4 weeks.  Precautions and restrictions reviewed.  Continue breastfeeding.

## 2021-05-28 ENCOUNTER — APPOINTMENT (OUTPATIENT)
Dept: OBGYN | Facility: CLINIC | Age: 31
End: 2021-05-28
Payer: MEDICAID

## 2021-05-28 VITALS — SYSTOLIC BLOOD PRESSURE: 110 MMHG | DIASTOLIC BLOOD PRESSURE: 76 MMHG | WEIGHT: 112 LBS | BODY MASS INDEX: 19.84 KG/M2

## 2021-05-28 PROCEDURE — 99213 OFFICE O/P EST LOW 20 MIN: CPT

## 2021-05-28 NOTE — HISTORY OF PRESENT ILLNESS
[Postpartum Follow Up] : postpartum follow up [Complications:___] : no complications [Primary C/S] : delivered by  section [Breastfeeding] : not currently nursing [S/Sx PP Depression] : no signs/symptoms of postpartum depression [Erythema] : not erythematous [Back to Normal] : is back to normal in size [Normal] : the vagina was normal [Mild] : mild vaginal bleeding [Cervix Sample Taken] : cervical sample not taken for a Pap smear [Examination Of The Breasts] : breasts are normal [Doing Well] : is doing well [No Sign of Infection] : is showing no signs of infection [Excellent Pain Control] : has excellent pain control [None] : None [FreeTextEntry8] : happy feels good , breast feeding .  [de-identified] : ltcs , breast feeding .  [de-identified] : rto for pap , diet , pnv . Discussed contraception .

## 2021-08-26 ENCOUNTER — LABORATORY RESULT (OUTPATIENT)
Age: 31
End: 2021-08-26

## 2021-08-27 ENCOUNTER — APPOINTMENT (OUTPATIENT)
Dept: OBGYN | Facility: CLINIC | Age: 31
End: 2021-08-27
Payer: MEDICAID

## 2021-08-27 VITALS — DIASTOLIC BLOOD PRESSURE: 70 MMHG | WEIGHT: 113 LBS | SYSTOLIC BLOOD PRESSURE: 116 MMHG | BODY MASS INDEX: 20.02 KG/M2

## 2021-08-27 PROCEDURE — 99395 PREV VISIT EST AGE 18-39: CPT

## 2021-08-27 NOTE — HISTORY OF PRESENT ILLNESS
Final Anesthesia Post-op Assessment    Patient: Maureen Rivera  Procedure(s) Performed: ROBOTIC XI ASSISTED HYSTERECTOMY WITH BILATERAL SALPINGECTOMY AND POSSIBLE BILATERAL OOPHORECTOMY  Anesthesia type: General    Vitals Value Taken Time   Temp 36.4 °C (97.6 °F) 2/25/2020 11:44 AM   Pulse 62 2/25/2020  1:16 PM   Resp 14 2/25/2020  1:16 PM   SpO2 97 % 2/25/2020  1:16 PM   /66 2/25/2020  1:16 PM       Last 24 I/O:     Intake/Output Summary (Last 24 hours) at 2/25/2020 1325  Last data filed at 2/25/2020 1143  Gross per 24 hour   Intake 1050 ml   Output 230 ml   Net 820 ml         Patient Location: PACU Phase 2  Post-op Vital Signs:stable  Level of Consciousness: participates in exam, awake, oriented, alert and answers questions appropriately  Respiratory Status: spontaneous ventilation  Cardiovascular blood pressure returned to baseline and stable  Hydration: euvolemic  Pain Management: adequately controlled  Handoff: Handoff to receiving nurse was performed and questions were answered  Nausea: None  Airway Patency:patent  Post-op Assessment: awake, alert, appropriately conversant, or baseline, no complications, patient tolerated procedure well with no complications and evidence of recall      
[FreeTextEntry1] : pt feels good , happy , breast feeding . Needs vitamins . Using no protection .

## 2022-07-04 NOTE — PATIENT PROFILE OB - AS LABOR RUPTURE OF MEMBRANES YN
normal rate, regular rhythm, and no murmur. yes normal rate, regular rhythm, and no gallops or rubs. 2+ pulses in bilateral dp and radial arteries. Cap refill less than 2 seconds.

## 2022-09-07 ENCOUNTER — APPOINTMENT (OUTPATIENT)
Dept: OBGYN | Facility: CLINIC | Age: 32
End: 2022-09-07

## 2022-09-07 VITALS
DIASTOLIC BLOOD PRESSURE: 87 MMHG | WEIGHT: 106 LBS | BODY MASS INDEX: 18.78 KG/M2 | OXYGEN SATURATION: 100 % | SYSTOLIC BLOOD PRESSURE: 115 MMHG | HEART RATE: 79 BPM

## 2022-09-07 PROCEDURE — 99395 PREV VISIT EST AGE 18-39: CPT

## 2022-09-07 RX ORDER — PRENATAL VIT,CAL 76/IRON/FOLIC 29 MG-1 MG
29-1 TABLET ORAL DAILY
Qty: 30 | Refills: 11 | Status: COMPLETED | COMMUNITY
Start: 2021-02-19 | End: 2022-09-07

## 2022-09-07 RX ORDER — PHENOL 1.4 %
325 AEROSOL, SPRAY (ML) MUCOUS MEMBRANE
Qty: 30 | Refills: 6 | Status: COMPLETED | COMMUNITY
Start: 2021-03-05 | End: 2022-09-07

## 2022-09-07 NOTE — HISTORY OF PRESENT ILLNESS
[FreeTextEntry1] : Patient is a 32 year old female who presents for her annual exam.  Reports normal menses, one partner, no abdominal or pelvic pain, change in discharge, change in bowel or bladder habits or any other concerns.  Due for pap. +sexually active.

## 2022-09-12 LAB
C TRACH RRNA SPEC QL NAA+PROBE: NOT DETECTED
CYTOLOGY CVX/VAG DOC THIN PREP: ABNORMAL
N GONORRHOEA RRNA SPEC QL NAA+PROBE: NOT DETECTED
SOURCE TP AMPLIFICATION: NORMAL

## 2023-05-19 ENCOUNTER — EMERGENCY (EMERGENCY)
Facility: HOSPITAL | Age: 33
LOS: 1 days | Discharge: HOME | End: 2023-05-19
Attending: STUDENT IN AN ORGANIZED HEALTH CARE EDUCATION/TRAINING PROGRAM
Payer: MEDICAID

## 2023-05-19 VITALS
TEMPERATURE: 98 F | SYSTOLIC BLOOD PRESSURE: 111 MMHG | RESPIRATION RATE: 16 BRPM | OXYGEN SATURATION: 100 % | HEART RATE: 76 BPM | DIASTOLIC BLOOD PRESSURE: 72 MMHG

## 2023-05-19 VITALS
DIASTOLIC BLOOD PRESSURE: 77 MMHG | HEART RATE: 80 BPM | TEMPERATURE: 98 F | OXYGEN SATURATION: 98 % | SYSTOLIC BLOOD PRESSURE: 105 MMHG | RESPIRATION RATE: 16 BRPM | WEIGHT: 113.1 LBS

## 2023-05-19 LAB
ALBUMIN SERPL ELPH-MCNC: 3.3 G/DL — LOW (ref 3.5–5)
ALP SERPL-CCNC: 47 U/L — SIGNIFICANT CHANGE UP (ref 40–120)
ALT FLD-CCNC: 14 U/L DA — SIGNIFICANT CHANGE UP (ref 10–60)
ANION GAP SERPL CALC-SCNC: 1 MMOL/L — LOW (ref 5–17)
APPEARANCE UR: CLEAR — SIGNIFICANT CHANGE UP
AST SERPL-CCNC: 9 U/L — LOW (ref 10–40)
BACTERIA # UR AUTO: ABNORMAL /HPF
BASOPHILS # BLD AUTO: 0.07 K/UL — SIGNIFICANT CHANGE UP (ref 0–0.2)
BASOPHILS NFR BLD AUTO: 0.7 % — SIGNIFICANT CHANGE UP (ref 0–2)
BILIRUB SERPL-MCNC: 0.3 MG/DL — SIGNIFICANT CHANGE UP (ref 0.2–1.2)
BILIRUB UR-MCNC: NEGATIVE — SIGNIFICANT CHANGE UP
BLD GP AB SCN SERPL QL: SIGNIFICANT CHANGE UP
BUN SERPL-MCNC: 15 MG/DL — SIGNIFICANT CHANGE UP (ref 7–18)
CALCIUM SERPL-MCNC: 9.1 MG/DL — SIGNIFICANT CHANGE UP (ref 8.4–10.5)
CHLORIDE SERPL-SCNC: 107 MMOL/L — SIGNIFICANT CHANGE UP (ref 96–108)
CO2 SERPL-SCNC: 30 MMOL/L — SIGNIFICANT CHANGE UP (ref 22–31)
COLOR SPEC: YELLOW — SIGNIFICANT CHANGE UP
CREAT SERPL-MCNC: 0.66 MG/DL — SIGNIFICANT CHANGE UP (ref 0.5–1.3)
DIFF PNL FLD: ABNORMAL
EGFR: 119 ML/MIN/1.73M2 — SIGNIFICANT CHANGE UP
EOSINOPHIL # BLD AUTO: 0.25 K/UL — SIGNIFICANT CHANGE UP (ref 0–0.5)
EOSINOPHIL NFR BLD AUTO: 2.5 % — SIGNIFICANT CHANGE UP (ref 0–6)
EPI CELLS # UR: SIGNIFICANT CHANGE UP /HPF
GLUCOSE SERPL-MCNC: 101 MG/DL — HIGH (ref 70–99)
GLUCOSE UR QL: NEGATIVE — SIGNIFICANT CHANGE UP
HCG SERPL-ACNC: 937 MIU/ML — HIGH
HCT VFR BLD CALC: 42.8 % — SIGNIFICANT CHANGE UP (ref 34.5–45)
HGB BLD-MCNC: 14.1 G/DL — SIGNIFICANT CHANGE UP (ref 11.5–15.5)
IMM GRANULOCYTES NFR BLD AUTO: 0.3 % — SIGNIFICANT CHANGE UP (ref 0–0.9)
KETONES UR-MCNC: NEGATIVE — SIGNIFICANT CHANGE UP
LEUKOCYTE ESTERASE UR-ACNC: ABNORMAL
LYMPHOCYTES # BLD AUTO: 2.25 K/UL — SIGNIFICANT CHANGE UP (ref 1–3.3)
LYMPHOCYTES # BLD AUTO: 22.5 % — SIGNIFICANT CHANGE UP (ref 13–44)
MCHC RBC-ENTMCNC: 29.6 PG — SIGNIFICANT CHANGE UP (ref 27–34)
MCHC RBC-ENTMCNC: 32.9 GM/DL — SIGNIFICANT CHANGE UP (ref 32–36)
MCV RBC AUTO: 89.9 FL — SIGNIFICANT CHANGE UP (ref 80–100)
MONOCYTES # BLD AUTO: 0.6 K/UL — SIGNIFICANT CHANGE UP (ref 0–0.9)
MONOCYTES NFR BLD AUTO: 6 % — SIGNIFICANT CHANGE UP (ref 2–14)
NEUTROPHILS # BLD AUTO: 6.81 K/UL — SIGNIFICANT CHANGE UP (ref 1.8–7.4)
NEUTROPHILS NFR BLD AUTO: 68 % — SIGNIFICANT CHANGE UP (ref 43–77)
NITRITE UR-MCNC: NEGATIVE — SIGNIFICANT CHANGE UP
NRBC # BLD: 0 /100 WBCS — SIGNIFICANT CHANGE UP (ref 0–0)
PH UR: 6 — SIGNIFICANT CHANGE UP (ref 5–8)
PLATELET # BLD AUTO: 328 K/UL — SIGNIFICANT CHANGE UP (ref 150–400)
POTASSIUM SERPL-MCNC: 4.3 MMOL/L — SIGNIFICANT CHANGE UP (ref 3.5–5.3)
POTASSIUM SERPL-SCNC: 4.3 MMOL/L — SIGNIFICANT CHANGE UP (ref 3.5–5.3)
PROT SERPL-MCNC: 7.2 G/DL — SIGNIFICANT CHANGE UP (ref 6–8.3)
PROT UR-MCNC: 30 MG/DL
RBC # BLD: 4.76 M/UL — SIGNIFICANT CHANGE UP (ref 3.8–5.2)
RBC # FLD: 12.8 % — SIGNIFICANT CHANGE UP (ref 10.3–14.5)
RBC CASTS # UR COMP ASSIST: >50 /HPF (ref 0–2)
SODIUM SERPL-SCNC: 138 MMOL/L — SIGNIFICANT CHANGE UP (ref 135–145)
SP GR SPEC: 1.02 — SIGNIFICANT CHANGE UP (ref 1.01–1.02)
UROBILINOGEN FLD QL: NEGATIVE — SIGNIFICANT CHANGE UP
WBC # BLD: 10.01 K/UL — SIGNIFICANT CHANGE UP (ref 3.8–10.5)
WBC # FLD AUTO: 10.01 K/UL — SIGNIFICANT CHANGE UP (ref 3.8–10.5)
WBC UR QL: SIGNIFICANT CHANGE UP /HPF (ref 0–5)

## 2023-05-19 PROCEDURE — 86901 BLOOD TYPING SEROLOGIC RH(D): CPT

## 2023-05-19 PROCEDURE — 81001 URINALYSIS AUTO W/SCOPE: CPT

## 2023-05-19 PROCEDURE — 80053 COMPREHEN METABOLIC PANEL: CPT

## 2023-05-19 PROCEDURE — 76817 TRANSVAGINAL US OBSTETRIC: CPT | Mod: 26

## 2023-05-19 PROCEDURE — 76817 TRANSVAGINAL US OBSTETRIC: CPT

## 2023-05-19 PROCEDURE — 85025 COMPLETE CBC W/AUTO DIFF WBC: CPT

## 2023-05-19 PROCEDURE — 99284 EMERGENCY DEPT VISIT MOD MDM: CPT | Mod: 25

## 2023-05-19 PROCEDURE — 86850 RBC ANTIBODY SCREEN: CPT

## 2023-05-19 PROCEDURE — 36415 COLL VENOUS BLD VENIPUNCTURE: CPT

## 2023-05-19 PROCEDURE — 86900 BLOOD TYPING SEROLOGIC ABO: CPT

## 2023-05-19 PROCEDURE — 99284 EMERGENCY DEPT VISIT MOD MDM: CPT

## 2023-05-19 PROCEDURE — 84702 CHORIONIC GONADOTROPIN TEST: CPT

## 2023-05-19 PROCEDURE — 76801 OB US < 14 WKS SINGLE FETUS: CPT | Mod: 26

## 2023-05-19 PROCEDURE — 96360 HYDRATION IV INFUSION INIT: CPT

## 2023-05-19 PROCEDURE — 76802 OB US < 14 WKS ADDL FETUS: CPT

## 2023-05-19 RX ORDER — SODIUM CHLORIDE 9 MG/ML
1000 INJECTION INTRAMUSCULAR; INTRAVENOUS; SUBCUTANEOUS ONCE
Refills: 0 | Status: COMPLETED | OUTPATIENT
Start: 2023-05-19 | End: 2023-05-19

## 2023-05-19 RX ADMIN — SODIUM CHLORIDE 1000 MILLILITER(S): 9 INJECTION INTRAMUSCULAR; INTRAVENOUS; SUBCUTANEOUS at 11:39

## 2023-05-19 RX ADMIN — SODIUM CHLORIDE 1000 MILLILITER(S): 9 INJECTION INTRAMUSCULAR; INTRAVENOUS; SUBCUTANEOUS at 12:39

## 2023-05-19 NOTE — ED PROVIDER NOTE - OBJECTIVE STATEMENT
33 year old female (, LMP 4/3) with no pertinent medical history presents to ED complaining of vaginal bleeding. She noted light bleeding since yesterday, using only a few pads, but she became concerned so she came to the ED today. Patient also complains of intermittent LLQ pain for the last 5 days which she attributed to early pregnancy. Denies nausea, vomiting, fever, chills, or dysuria. Previous pregnancy reportedly resulted in  delivery. No history of ectopic pregnancy. NKDA.

## 2023-05-19 NOTE — ED PROVIDER NOTE - PATIENT PORTAL LINK FT
You can access the FollowMyHealth Patient Portal offered by [facility] by registering at the following website: [email or web address]. By joining [employer]’s FollowMyHealth portal, you will also be able to view your health information using other applications (apps) compatible with our system.

## 2023-05-19 NOTE — ED ADULT NURSE NOTE - NSFALLUNIVINTERV_ED_ALL_ED
Bed/Stretcher in lowest position, wheels locked, appropriate side rails in place/Call bell, personal items and telephone in reach/Instruct patient to call for assistance before getting out of bed/chair/stretcher/Non-slip footwear applied when patient is off stretcher/[place] to call system/Physically safe environment - no spills, clutter or unnecessary equipment/Purposeful proactive rounding/Room/bathroom lighting operational, light cord in reach

## 2023-05-19 NOTE — ED PROVIDER NOTE - CLINICAL SUMMARY MEDICAL DECISION MAKING FREE TEXT BOX
, ~6 weeks gestation, presents with vaginal bleeding. Has not had confirmatory ultrasound yet. Will send for screen labs including beta-hcg, TVUS to confirm pregnancy, and reassess. 32 y/o , approx. 6 weeks gestation, presents with vaginal bleeding.   Has not had confirmatory ultrasound yet.   Will send for screen labs including BHCG, UA, TVUS to confirm pregnancy, and reassess.  No active bleeding during visit.  TVUS ultimately unable to visualize IUP. Suspect early pregnancy vs. complete  but needs serial BHCG/TVUS

## 2023-05-19 NOTE — ED PROVIDER NOTE - PROGRESS NOTE DETAILS
Unable to visualize fetus on Ultrasound. Discussed possibilities of complete , ectopic pregnancy. Recommended that patient return to the emergency department in 48-72 hours or follow up with her OBGYN for repeat sonogram and BHCG. Return precautions discused. Unable to visualize fetus on Ultrasound. Discussed possibilities of complete  or ectopic pregnancy. Recommended that patient return to the emergency department in 48-72 hours or follow up with her OBGYN for repeat sonogram and BHCG. Return precautions discussed.

## 2023-05-19 NOTE — ED PROVIDER NOTE - NSFOLLOWUPINSTRUCTIONS_ED_ALL_ED_FT
Please return to the emergency department in 48-72 hours for repeat Ultrasound and BHCG or follow up with your OBGYN doctor.   Return to the emergency department for worsening pain, bleeding that cannot be controlled or any other concerns.

## 2023-05-22 ENCOUNTER — LABORATORY RESULT (OUTPATIENT)
Age: 33
End: 2023-05-22

## 2023-05-22 PROBLEM — Z78.9 OTHER SPECIFIED HEALTH STATUS: Chronic | Status: ACTIVE | Noted: 2023-05-19

## 2023-05-23 ENCOUNTER — TRANSCRIPTION ENCOUNTER (OUTPATIENT)
Age: 33
End: 2023-05-23

## 2023-05-23 ENCOUNTER — APPOINTMENT (OUTPATIENT)
Dept: OBGYN | Facility: CLINIC | Age: 33
End: 2023-05-23
Payer: MEDICAID

## 2023-05-23 ENCOUNTER — ASOB RESULT (OUTPATIENT)
Age: 33
End: 2023-05-23

## 2023-05-23 VITALS — BODY MASS INDEX: 20.55 KG/M2 | DIASTOLIC BLOOD PRESSURE: 82 MMHG | SYSTOLIC BLOOD PRESSURE: 127 MMHG | WEIGHT: 116 LBS

## 2023-05-23 DIAGNOSIS — O03.9 COMPLETE OR UNSPECIFIED SPONTANEOUS ABORTION W/OUT COMPLICATION: ICD-10-CM

## 2023-05-23 PROCEDURE — 76830 TRANSVAGINAL US NON-OB: CPT

## 2023-05-23 PROCEDURE — 99214 OFFICE O/P EST MOD 30 MIN: CPT | Mod: TH

## 2023-05-24 ENCOUNTER — APPOINTMENT (OUTPATIENT)
Dept: OBGYN | Facility: CLINIC | Age: 33
End: 2023-05-24

## 2023-05-26 PROBLEM — O03.9 MISCARRIAGE: Status: ACTIVE | Noted: 2023-05-23

## 2023-05-27 ENCOUNTER — LABORATORY RESULT (OUTPATIENT)
Age: 33
End: 2023-05-27

## 2023-06-08 ENCOUNTER — APPOINTMENT (OUTPATIENT)
Dept: OBGYN | Facility: CLINIC | Age: 33
End: 2023-06-08

## 2023-06-08 ENCOUNTER — NON-APPOINTMENT (OUTPATIENT)
Age: 33
End: 2023-06-08

## 2023-09-13 ENCOUNTER — APPOINTMENT (OUTPATIENT)
Dept: OBGYN | Facility: CLINIC | Age: 33
End: 2023-09-13

## 2023-11-06 ENCOUNTER — LABORATORY RESULT (OUTPATIENT)
Age: 33
End: 2023-11-06

## 2023-11-06 ENCOUNTER — APPOINTMENT (OUTPATIENT)
Dept: OBGYN | Facility: CLINIC | Age: 33
End: 2023-11-06
Payer: MEDICAID

## 2023-11-06 VITALS — BODY MASS INDEX: 20.55 KG/M2 | SYSTOLIC BLOOD PRESSURE: 126 MMHG | WEIGHT: 116 LBS | DIASTOLIC BLOOD PRESSURE: 82 MMHG

## 2023-11-06 DIAGNOSIS — Z01.419 ENCOUNTER FOR GYNECOLOGICAL EXAMINATION (GENERAL) (ROUTINE) W/OUT ABNORMAL FINDINGS: ICD-10-CM

## 2023-11-06 PROCEDURE — 99395 PREV VISIT EST AGE 18-39: CPT

## 2023-11-14 PROBLEM — Z01.419 WELL WOMAN EXAM: Status: ACTIVE | Noted: 2021-08-27

## 2024-02-29 ENCOUNTER — APPOINTMENT (OUTPATIENT)
Dept: OBGYN | Facility: CLINIC | Age: 34
End: 2024-02-29

## 2024-03-05 ENCOUNTER — APPOINTMENT (OUTPATIENT)
Dept: OBGYN | Facility: CLINIC | Age: 34
End: 2024-03-05
Payer: MEDICAID

## 2024-03-05 VITALS — DIASTOLIC BLOOD PRESSURE: 68 MMHG | WEIGHT: 114 LBS | BODY MASS INDEX: 20.19 KG/M2 | SYSTOLIC BLOOD PRESSURE: 119 MMHG

## 2024-03-05 DIAGNOSIS — Z31.69 ENCOUNTER FOR OTHER GENERAL COUNSELING AND ADVICE ON PROCREATION: ICD-10-CM

## 2024-03-05 PROCEDURE — 99213 OFFICE O/P EST LOW 20 MIN: CPT

## 2024-03-05 RX ORDER — ASPIRIN 81 MG
81 TABLET,CHEWABLE ORAL
Qty: 30 | Refills: 3 | Status: ACTIVE | COMMUNITY
Start: 2023-11-06 | End: 1900-01-01

## 2024-03-05 RX ORDER — .BETA.-CAROTENE, ASCORBIC ACID, CHOLECALCIFEROL, .ALPHA.-TOCOPHEROL ACETATE, DL-, THIAMINE MONONITRATE, RIBOFLAVIN, NIACINAMIDE, PYRIDOXINE HYDROCHLORIDE, FOLIC ACID, CYANOCOBALAMIN, CALCIUM PANTOTHENATE, CALCIUM CARBONATE, FERROUS FUMARATE, ZINC OXIDE, AND DOCUSATE SODIUM 1000; 100; 400; 30; 3; 3; 15; 20; 1; 12; 7; 200; 29; 20; 25 [IU]/1; MG/1; [IU]/1; [IU]/1; MG/1; MG/1; MG/1; MG/1; MG/1; UG/1; MG/1; MG/1; MG/1; MG/1; MG/1
29-1 TABLET, COATED ORAL DAILY
Qty: 30 | Refills: 11 | Status: ACTIVE | COMMUNITY
Start: 2023-05-23 | End: 1900-01-01

## 2024-03-05 NOTE — PHYSICAL EXAM
[Appropriately responsive] : appropriately responsive [Alert] : alert [No Acute Distress] : no acute distress [No Lymphadenopathy] : no lymphadenopathy [No Murmurs] : no murmurs [Regular Rate Rhythm] : regular rate rhythm [Non-tender] : non-tender [Clear to Auscultation B/L] : clear to auscultation bilaterally [Soft] : soft [Non-distended] : non-distended [No HSM] : No HSM [No Lesions] : no lesions [No Mass] : no mass [Oriented x3] : oriented x3

## 2024-05-03 ENCOUNTER — LABORATORY RESULT (OUTPATIENT)
Age: 34
End: 2024-05-03

## 2024-05-03 ENCOUNTER — APPOINTMENT (OUTPATIENT)
Dept: OBGYN | Facility: CLINIC | Age: 34
End: 2024-05-03
Payer: MEDICAID

## 2024-05-03 VITALS
OXYGEN SATURATION: 100 % | HEART RATE: 90 BPM | WEIGHT: 114 LBS | DIASTOLIC BLOOD PRESSURE: 69 MMHG | BODY MASS INDEX: 20.19 KG/M2 | SYSTOLIC BLOOD PRESSURE: 104 MMHG

## 2024-05-03 PROCEDURE — 99214 OFFICE O/P EST MOD 30 MIN: CPT | Mod: TH

## 2024-05-10 ENCOUNTER — LABORATORY RESULT (OUTPATIENT)
Age: 34
End: 2024-05-10

## 2024-05-22 ENCOUNTER — APPOINTMENT (OUTPATIENT)
Dept: ANTEPARTUM | Facility: CLINIC | Age: 34
End: 2024-05-22
Payer: MEDICAID

## 2024-05-22 ENCOUNTER — ASOB RESULT (OUTPATIENT)
Age: 34
End: 2024-05-22

## 2024-05-22 PROCEDURE — 76801 OB US < 14 WKS SINGLE FETUS: CPT | Mod: 59

## 2024-05-22 PROCEDURE — 76813 OB US NUCHAL MEAS 1 GEST: CPT

## 2024-05-30 ENCOUNTER — NON-APPOINTMENT (OUTPATIENT)
Age: 34
End: 2024-05-30

## 2024-06-06 ENCOUNTER — APPOINTMENT (OUTPATIENT)
Dept: OBGYN | Facility: CLINIC | Age: 34
End: 2024-06-06

## 2024-06-06 VITALS — WEIGHT: 117 LBS | SYSTOLIC BLOOD PRESSURE: 104 MMHG | DIASTOLIC BLOOD PRESSURE: 70 MMHG | BODY MASS INDEX: 20.73 KG/M2

## 2024-06-06 PROCEDURE — 99213 OFFICE O/P EST LOW 20 MIN: CPT | Mod: TH

## 2024-06-06 RX ORDER — PRENATAL VIT NO.130/IRON/FOLIC 27MG-0.8MG
27-0.8 TABLET ORAL DAILY
Qty: 30 | Refills: 11 | Status: ACTIVE | COMMUNITY
Start: 2024-06-06 | End: 1900-01-01

## 2024-06-21 ENCOUNTER — LABORATORY RESULT (OUTPATIENT)
Age: 34
End: 2024-06-21

## 2024-07-01 ENCOUNTER — APPOINTMENT (OUTPATIENT)
Dept: OBGYN | Facility: CLINIC | Age: 34
End: 2024-07-01
Payer: MEDICAID

## 2024-07-01 VITALS — SYSTOLIC BLOOD PRESSURE: 104 MMHG | WEIGHT: 117 LBS | BODY MASS INDEX: 20.73 KG/M2 | DIASTOLIC BLOOD PRESSURE: 66 MMHG

## 2024-07-01 PROCEDURE — 99213 OFFICE O/P EST LOW 20 MIN: CPT | Mod: TH

## 2024-07-10 ENCOUNTER — NON-APPOINTMENT (OUTPATIENT)
Age: 34
End: 2024-07-10

## 2024-07-17 ENCOUNTER — APPOINTMENT (OUTPATIENT)
Dept: ANTEPARTUM | Facility: CLINIC | Age: 34
End: 2024-07-17
Payer: MEDICAID

## 2024-07-17 ENCOUNTER — ASOB RESULT (OUTPATIENT)
Age: 34
End: 2024-07-17

## 2024-07-17 PROCEDURE — 76805 OB US >/= 14 WKS SNGL FETUS: CPT

## 2024-07-31 ENCOUNTER — APPOINTMENT (OUTPATIENT)
Dept: ANTEPARTUM | Facility: CLINIC | Age: 34
End: 2024-07-31
Payer: MEDICAID

## 2024-07-31 ENCOUNTER — ASOB RESULT (OUTPATIENT)
Age: 34
End: 2024-07-31

## 2024-07-31 PROCEDURE — 76816 OB US FOLLOW-UP PER FETUS: CPT

## 2024-08-01 ENCOUNTER — NON-APPOINTMENT (OUTPATIENT)
Age: 34
End: 2024-08-01

## 2024-08-01 ENCOUNTER — APPOINTMENT (OUTPATIENT)
Dept: OBGYN | Facility: CLINIC | Age: 34
End: 2024-08-01
Payer: COMMERCIAL

## 2024-08-01 VITALS — WEIGHT: 123 LBS | SYSTOLIC BLOOD PRESSURE: 92 MMHG | BODY MASS INDEX: 21.79 KG/M2 | DIASTOLIC BLOOD PRESSURE: 63 MMHG

## 2024-08-01 PROCEDURE — 99213 OFFICE O/P EST LOW 20 MIN: CPT

## 2024-08-26 ENCOUNTER — LABORATORY RESULT (OUTPATIENT)
Age: 34
End: 2024-08-26

## 2024-08-28 ENCOUNTER — NON-APPOINTMENT (OUTPATIENT)
Age: 34
End: 2024-08-28

## 2024-09-05 ENCOUNTER — NON-APPOINTMENT (OUTPATIENT)
Age: 34
End: 2024-09-05

## 2024-09-06 ENCOUNTER — APPOINTMENT (OUTPATIENT)
Dept: OBGYN | Facility: CLINIC | Age: 34
End: 2024-09-06
Payer: MEDICAID

## 2024-09-06 ENCOUNTER — APPOINTMENT (OUTPATIENT)
Dept: OBGYN | Facility: CLINIC | Age: 34
End: 2024-09-06

## 2024-09-06 VITALS
OXYGEN SATURATION: 100 % | BODY MASS INDEX: 22.32 KG/M2 | HEART RATE: 99 BPM | DIASTOLIC BLOOD PRESSURE: 70 MMHG | WEIGHT: 126 LBS | SYSTOLIC BLOOD PRESSURE: 107 MMHG

## 2024-09-06 DIAGNOSIS — O99.891 OTHER SPECIFIED DISEASES AND CONDITIONS COMPLICATING PREGNANCY: ICD-10-CM

## 2024-09-06 DIAGNOSIS — M54.9 OTHER SPECIFIED DISEASES AND CONDITIONS COMPLICATING PREGNANCY: ICD-10-CM

## 2024-09-06 PROCEDURE — 99213 OFFICE O/P EST LOW 20 MIN: CPT | Mod: TH,25

## 2024-09-13 ENCOUNTER — LABORATORY RESULT (OUTPATIENT)
Age: 34
End: 2024-09-13

## 2024-09-23 ENCOUNTER — NON-APPOINTMENT (OUTPATIENT)
Age: 34
End: 2024-09-23

## 2024-09-24 ENCOUNTER — APPOINTMENT (OUTPATIENT)
Dept: OBGYN | Facility: CLINIC | Age: 34
End: 2024-09-24

## 2024-09-24 VITALS — WEIGHT: 129 LBS | DIASTOLIC BLOOD PRESSURE: 68 MMHG | BODY MASS INDEX: 22.85 KG/M2 | SYSTOLIC BLOOD PRESSURE: 100 MMHG

## 2024-09-24 PROCEDURE — 99213 OFFICE O/P EST LOW 20 MIN: CPT | Mod: TH

## 2024-10-05 ENCOUNTER — NON-APPOINTMENT (OUTPATIENT)
Age: 34
End: 2024-10-05

## 2024-10-05 ENCOUNTER — OUTPATIENT (OUTPATIENT)
Dept: OUTPATIENT SERVICES | Facility: HOSPITAL | Age: 34
LOS: 1 days | End: 2024-10-05
Payer: MEDICAID

## 2024-10-05 VITALS
OXYGEN SATURATION: 99 % | SYSTOLIC BLOOD PRESSURE: 93 MMHG | TEMPERATURE: 98 F | RESPIRATION RATE: 18 BRPM | HEART RATE: 77 BPM | DIASTOLIC BLOOD PRESSURE: 61 MMHG

## 2024-10-05 DIAGNOSIS — O26.899 OTHER SPECIFIED PREGNANCY RELATED CONDITIONS, UNSPECIFIED TRIMESTER: ICD-10-CM

## 2024-10-05 LAB
ALBUMIN SERPL ELPH-MCNC: 2.3 G/DL — LOW (ref 3.5–5)
ALP SERPL-CCNC: 75 U/L — SIGNIFICANT CHANGE UP (ref 40–120)
ALT FLD-CCNC: 34 U/L DA — SIGNIFICANT CHANGE UP (ref 10–60)
ANION GAP SERPL CALC-SCNC: 6 MMOL/L — SIGNIFICANT CHANGE UP (ref 5–17)
APPEARANCE UR: ABNORMAL
AST SERPL-CCNC: 26 U/L — SIGNIFICANT CHANGE UP (ref 10–40)
BACTERIA # UR AUTO: ABNORMAL /HPF
BASOPHILS # BLD AUTO: 0.05 K/UL — SIGNIFICANT CHANGE UP (ref 0–0.2)
BASOPHILS NFR BLD AUTO: 0.5 % — SIGNIFICANT CHANGE UP (ref 0–2)
BILIRUB SERPL-MCNC: 0.4 MG/DL — SIGNIFICANT CHANGE UP (ref 0.2–1.2)
BILIRUB UR-MCNC: NEGATIVE — SIGNIFICANT CHANGE UP
BUN SERPL-MCNC: 12 MG/DL — SIGNIFICANT CHANGE UP (ref 7–18)
CALCIUM SERPL-MCNC: 8.7 MG/DL — SIGNIFICANT CHANGE UP (ref 8.4–10.5)
CHLORIDE SERPL-SCNC: 109 MMOL/L — HIGH (ref 96–108)
CO2 SERPL-SCNC: 21 MMOL/L — LOW (ref 22–31)
COLOR SPEC: YELLOW — SIGNIFICANT CHANGE UP
CREAT SERPL-MCNC: 0.56 MG/DL — SIGNIFICANT CHANGE UP (ref 0.5–1.3)
DIFF PNL FLD: NEGATIVE — SIGNIFICANT CHANGE UP
EGFR: 123 ML/MIN/1.73M2 — SIGNIFICANT CHANGE UP
EGFR: 123 ML/MIN/1.73M2 — SIGNIFICANT CHANGE UP
EOSINOPHIL # BLD AUTO: 0.3 K/UL — SIGNIFICANT CHANGE UP (ref 0–0.5)
EOSINOPHIL NFR BLD AUTO: 3.3 % — SIGNIFICANT CHANGE UP (ref 0–6)
EPI CELLS # UR: PRESENT
GLUCOSE SERPL-MCNC: 78 MG/DL — SIGNIFICANT CHANGE UP (ref 70–99)
GLUCOSE UR QL: 100 MG/DL
HCT VFR BLD CALC: 31.2 % — LOW (ref 34.5–45)
HGB BLD-MCNC: 10.5 G/DL — LOW (ref 11.5–15.5)
IMM GRANULOCYTES NFR BLD AUTO: 0.9 % — SIGNIFICANT CHANGE UP (ref 0–0.9)
KETONES UR-MCNC: ABNORMAL MG/DL
LEUKOCYTE ESTERASE UR-ACNC: NEGATIVE — SIGNIFICANT CHANGE UP
LYMPHOCYTES # BLD AUTO: 1.37 K/UL — SIGNIFICANT CHANGE UP (ref 1–3.3)
LYMPHOCYTES # BLD AUTO: 15 % — SIGNIFICANT CHANGE UP (ref 13–44)
MCHC RBC-ENTMCNC: 29.5 PG — SIGNIFICANT CHANGE UP (ref 27–34)
MCHC RBC-ENTMCNC: 33.7 GM/DL — SIGNIFICANT CHANGE UP (ref 32–36)
MCV RBC AUTO: 87.6 FL — SIGNIFICANT CHANGE UP (ref 80–100)
MONOCYTES # BLD AUTO: 0.54 K/UL — SIGNIFICANT CHANGE UP (ref 0–0.9)
MONOCYTES NFR BLD AUTO: 5.9 % — SIGNIFICANT CHANGE UP (ref 2–14)
NEUTROPHILS # BLD AUTO: 6.8 K/UL — SIGNIFICANT CHANGE UP (ref 1.8–7.4)
NEUTROPHILS NFR BLD AUTO: 74.4 % — SIGNIFICANT CHANGE UP (ref 43–77)
NITRITE UR-MCNC: NEGATIVE — SIGNIFICANT CHANGE UP
NRBC # BLD: 0 /100 WBCS — SIGNIFICANT CHANGE UP (ref 0–0)
NRBC BLD-RTO: 0 /100 WBCS — SIGNIFICANT CHANGE UP (ref 0–0)
PH UR: 6.5 — SIGNIFICANT CHANGE UP (ref 5–8)
PLATELET # BLD AUTO: 299 K/UL — SIGNIFICANT CHANGE UP (ref 150–400)
POTASSIUM SERPL-MCNC: 4.3 MMOL/L — SIGNIFICANT CHANGE UP (ref 3.5–5.3)
POTASSIUM SERPL-SCNC: 4.3 MMOL/L — SIGNIFICANT CHANGE UP (ref 3.5–5.3)
PROT SERPL-MCNC: 6.2 G/DL — SIGNIFICANT CHANGE UP (ref 6–8.3)
PROT UR-MCNC: 30 MG/DL
RBC # BLD: 3.56 M/UL — LOW (ref 3.8–5.2)
RBC # FLD: 12.2 % — SIGNIFICANT CHANGE UP (ref 10.3–14.5)
RBC CASTS # UR COMP ASSIST: 2 /HPF — SIGNIFICANT CHANGE UP (ref 0–4)
SODIUM SERPL-SCNC: 136 MMOL/L — SIGNIFICANT CHANGE UP (ref 135–145)
SP GR SPEC: 1.02 — SIGNIFICANT CHANGE UP (ref 1–1.03)
UROBILINOGEN FLD QL: 1 MG/DL — SIGNIFICANT CHANGE UP (ref 0.2–1)
WBC # BLD: 9.14 K/UL — SIGNIFICANT CHANGE UP (ref 3.8–10.5)
WBC # FLD AUTO: 9.14 K/UL — SIGNIFICANT CHANGE UP (ref 3.8–10.5)
WBC UR QL: 2 /HPF — SIGNIFICANT CHANGE UP (ref 0–5)

## 2024-10-05 PROCEDURE — G0463: CPT

## 2024-10-05 PROCEDURE — 96360 HYDRATION IV INFUSION INIT: CPT

## 2024-10-05 PROCEDURE — 87086 URINE CULTURE/COLONY COUNT: CPT

## 2024-10-05 PROCEDURE — 85025 COMPLETE CBC W/AUTO DIFF WBC: CPT

## 2024-10-05 PROCEDURE — 80053 COMPREHEN METABOLIC PANEL: CPT

## 2024-10-05 PROCEDURE — 99213 OFFICE O/P EST LOW 20 MIN: CPT

## 2024-10-05 PROCEDURE — 99283 EMERGENCY DEPT VISIT LOW MDM: CPT

## 2024-10-05 PROCEDURE — 81001 URINALYSIS AUTO W/SCOPE: CPT

## 2024-10-05 PROCEDURE — 93005 ELECTROCARDIOGRAM TRACING: CPT

## 2024-10-05 PROCEDURE — 36415 COLL VENOUS BLD VENIPUNCTURE: CPT

## 2024-10-05 PROCEDURE — 59025 FETAL NON-STRESS TEST: CPT

## 2024-10-05 RX ORDER — FERROUS SULFATE 137(45) MG
1 TABLET, EXTENDED RELEASE ORAL
Qty: 30 | Refills: 0
Start: 2024-10-05 | End: 2024-11-03

## 2024-10-05 RX ORDER — DOCUSATE SODIUM 100 MG
1 CAPSULE ORAL
Qty: 20 | Refills: 0
Start: 2024-10-05 | End: 2024-10-14

## 2024-10-05 RX ADMIN — Medication 1000 MILLILITER(S): at 15:57

## 2024-10-06 LAB
CULTURE RESULTS: SIGNIFICANT CHANGE UP
SPECIMEN SOURCE: SIGNIFICANT CHANGE UP

## 2024-10-09 DIAGNOSIS — O26.893 OTHER SPECIFIED PREGNANCY RELATED CONDITIONS, THIRD TRIMESTER: ICD-10-CM

## 2024-10-09 DIAGNOSIS — O34.219 MATERNAL CARE FOR UNSPECIFIED TYPE SCAR FROM PREVIOUS CESAREAN DELIVERY: ICD-10-CM

## 2024-10-09 DIAGNOSIS — E86.0 DEHYDRATION: ICD-10-CM

## 2024-10-09 DIAGNOSIS — O09.293 SUPERVISION OF PREGNANCY WITH OTHER POOR REPRODUCTIVE OR OBSTETRIC HISTORY, THIRD TRIMESTER: ICD-10-CM

## 2024-10-09 DIAGNOSIS — D64.9 ANEMIA, UNSPECIFIED: ICD-10-CM

## 2024-10-09 DIAGNOSIS — Z87.59 PERSONAL HISTORY OF OTHER COMPLICATIONS OF PREGNANCY, CHILDBIRTH AND THE PUERPERIUM: ICD-10-CM

## 2024-10-09 DIAGNOSIS — I74.4 EMBOLISM AND THROMBOSIS OF ARTERIES OF EXTREMITIES, UNSPECIFIED: ICD-10-CM

## 2024-10-09 DIAGNOSIS — O99.013 ANEMIA COMPLICATING PREGNANCY, THIRD TRIMESTER: ICD-10-CM

## 2024-10-09 DIAGNOSIS — O88.213 THROMBOEMBOLISM IN PREGNANCY, THIRD TRIMESTER: ICD-10-CM

## 2024-10-09 DIAGNOSIS — O99.283 ENDOCRINE, NUTRITIONAL AND METABOLIC DISEASES COMPLICATING PREGNANCY, THIRD TRIMESTER: ICD-10-CM

## 2024-10-09 DIAGNOSIS — R82.4 ACETONURIA: ICD-10-CM

## 2024-10-09 DIAGNOSIS — Z3A.31 31 WEEKS GESTATION OF PREGNANCY: ICD-10-CM

## 2024-10-10 ENCOUNTER — LABORATORY RESULT (OUTPATIENT)
Age: 34
End: 2024-10-10

## 2024-10-10 ENCOUNTER — ASOB RESULT (OUTPATIENT)
Age: 34
End: 2024-10-10

## 2024-10-10 ENCOUNTER — APPOINTMENT (OUTPATIENT)
Dept: ANTEPARTUM | Facility: CLINIC | Age: 34
End: 2024-10-10
Payer: MEDICAID

## 2024-10-10 PROCEDURE — 76816 OB US FOLLOW-UP PER FETUS: CPT

## 2024-10-17 ENCOUNTER — NON-APPOINTMENT (OUTPATIENT)
Age: 34
End: 2024-10-17

## 2024-10-17 ENCOUNTER — APPOINTMENT (OUTPATIENT)
Dept: OBGYN | Facility: CLINIC | Age: 34
End: 2024-10-17
Payer: MEDICAID

## 2024-10-17 VITALS — DIASTOLIC BLOOD PRESSURE: 71 MMHG | BODY MASS INDEX: 23.21 KG/M2 | SYSTOLIC BLOOD PRESSURE: 103 MMHG | WEIGHT: 131 LBS

## 2024-10-17 PROCEDURE — 99213 OFFICE O/P EST LOW 20 MIN: CPT | Mod: TH

## 2024-10-17 RX ORDER — PHENOL 1.4 %
325 AEROSOL, SPRAY (ML) MUCOUS MEMBRANE
Qty: 30 | Refills: 6 | Status: ACTIVE | COMMUNITY
Start: 2024-10-17 | End: 1900-01-01

## 2024-11-07 ENCOUNTER — NON-APPOINTMENT (OUTPATIENT)
Age: 34
End: 2024-11-07

## 2024-11-07 ENCOUNTER — LABORATORY RESULT (OUTPATIENT)
Age: 34
End: 2024-11-07

## 2024-11-07 ENCOUNTER — ASOB RESULT (OUTPATIENT)
Age: 34
End: 2024-11-07

## 2024-11-07 ENCOUNTER — APPOINTMENT (OUTPATIENT)
Dept: ANTEPARTUM | Facility: CLINIC | Age: 34
End: 2024-11-07
Payer: MEDICAID

## 2024-11-07 ENCOUNTER — APPOINTMENT (OUTPATIENT)
Dept: OBGYN | Facility: CLINIC | Age: 34
End: 2024-11-07
Payer: MEDICAID

## 2024-11-07 VITALS — BODY MASS INDEX: 23.56 KG/M2 | SYSTOLIC BLOOD PRESSURE: 105 MMHG | WEIGHT: 133 LBS | DIASTOLIC BLOOD PRESSURE: 72 MMHG

## 2024-11-07 PROCEDURE — 76816 OB US FOLLOW-UP PER FETUS: CPT

## 2024-11-07 PROCEDURE — 76819 FETAL BIOPHYS PROFIL W/O NST: CPT | Mod: 59

## 2024-11-07 PROCEDURE — 99213 OFFICE O/P EST LOW 20 MIN: CPT | Mod: TH

## 2024-11-14 ENCOUNTER — NON-APPOINTMENT (OUTPATIENT)
Age: 34
End: 2024-11-14

## 2024-11-14 ENCOUNTER — APPOINTMENT (OUTPATIENT)
Dept: OBGYN | Facility: CLINIC | Age: 34
End: 2024-11-14
Payer: MEDICAID

## 2024-11-14 VITALS — WEIGHT: 134 LBS | BODY MASS INDEX: 23.74 KG/M2 | DIASTOLIC BLOOD PRESSURE: 69 MMHG | SYSTOLIC BLOOD PRESSURE: 101 MMHG

## 2024-11-14 PROCEDURE — 99213 OFFICE O/P EST LOW 20 MIN: CPT | Mod: TH

## 2024-11-21 ENCOUNTER — APPOINTMENT (OUTPATIENT)
Dept: OBGYN | Facility: CLINIC | Age: 34
End: 2024-11-21
Payer: MEDICAID

## 2024-11-21 VITALS
SYSTOLIC BLOOD PRESSURE: 116 MMHG | OXYGEN SATURATION: 99 % | DIASTOLIC BLOOD PRESSURE: 80 MMHG | HEART RATE: 132 BPM | WEIGHT: 134 LBS | BODY MASS INDEX: 23.74 KG/M2

## 2024-11-21 PROCEDURE — 99213 OFFICE O/P EST LOW 20 MIN: CPT | Mod: TH

## 2024-11-26 ENCOUNTER — OUTPATIENT (OUTPATIENT)
Dept: OUTPATIENT SERVICES | Facility: HOSPITAL | Age: 34
LOS: 1 days | End: 2024-11-26
Payer: MEDICAID

## 2024-11-26 DIAGNOSIS — Z34.83 ENCOUNTER FOR SUPERVISION OF OTHER NORMAL PREGNANCY, THIRD TRIMESTER: ICD-10-CM

## 2024-11-26 DIAGNOSIS — Z01.818 ENCOUNTER FOR OTHER PREPROCEDURAL EXAMINATION: ICD-10-CM

## 2024-11-26 DIAGNOSIS — O34.219 MATERNAL CARE FOR UNSPECIFIED TYPE SCAR FROM PREVIOUS CESAREAN DELIVERY: ICD-10-CM

## 2024-11-26 LAB
ANION GAP SERPL CALC-SCNC: 6 MMOL/L — SIGNIFICANT CHANGE UP (ref 5–17)
APTT BLD: 27.3 SEC — SIGNIFICANT CHANGE UP (ref 24.5–35.6)
BLD GP AB SCN SERPL QL: SIGNIFICANT CHANGE UP
BUN SERPL-MCNC: 10 MG/DL — SIGNIFICANT CHANGE UP (ref 7–18)
CALCIUM SERPL-MCNC: 8.5 MG/DL — SIGNIFICANT CHANGE UP (ref 8.4–10.5)
CHLORIDE SERPL-SCNC: 110 MMOL/L — HIGH (ref 96–108)
CO2 SERPL-SCNC: 22 MMOL/L — SIGNIFICANT CHANGE UP (ref 22–31)
CREAT SERPL-MCNC: 0.49 MG/DL — LOW (ref 0.5–1.3)
EGFR: 127 ML/MIN/1.73M2 — SIGNIFICANT CHANGE UP
GLUCOSE SERPL-MCNC: 86 MG/DL — SIGNIFICANT CHANGE UP (ref 70–99)
HCT VFR BLD CALC: 34.4 % — LOW (ref 34.5–45)
HGB BLD-MCNC: 11.6 G/DL — SIGNIFICANT CHANGE UP (ref 11.5–15.5)
INR BLD: 0.91 RATIO — SIGNIFICANT CHANGE UP (ref 0.85–1.16)
MCHC RBC-ENTMCNC: 30.1 PG — SIGNIFICANT CHANGE UP (ref 27–34)
MCHC RBC-ENTMCNC: 33.7 G/DL — SIGNIFICANT CHANGE UP (ref 32–36)
MCV RBC AUTO: 89.1 FL — SIGNIFICANT CHANGE UP (ref 80–100)
NRBC # BLD: 0 /100 WBCS — SIGNIFICANT CHANGE UP (ref 0–0)
PLATELET # BLD AUTO: 255 K/UL — SIGNIFICANT CHANGE UP (ref 150–400)
POTASSIUM SERPL-MCNC: 3.7 MMOL/L — SIGNIFICANT CHANGE UP (ref 3.5–5.3)
POTASSIUM SERPL-SCNC: 3.7 MMOL/L — SIGNIFICANT CHANGE UP (ref 3.5–5.3)
PROTHROM AB SERPL-ACNC: 10.6 SEC — SIGNIFICANT CHANGE UP (ref 9.9–13.4)
RBC # BLD: 3.86 M/UL — SIGNIFICANT CHANGE UP (ref 3.8–5.2)
RBC # FLD: 15.4 % — HIGH (ref 10.3–14.5)
SODIUM SERPL-SCNC: 138 MMOL/L — SIGNIFICANT CHANGE UP (ref 135–145)
WBC # BLD: 7.91 K/UL — SIGNIFICANT CHANGE UP (ref 3.8–10.5)
WBC # FLD AUTO: 7.91 K/UL — SIGNIFICANT CHANGE UP (ref 3.8–10.5)

## 2024-11-27 ENCOUNTER — INPATIENT (INPATIENT)
Facility: HOSPITAL | Age: 34
LOS: 1 days | Discharge: HOME | DRG: 951 | End: 2024-11-29
Attending: OBSTETRICS & GYNECOLOGY | Admitting: OBSTETRICS & GYNECOLOGY
Payer: MEDICAID

## 2024-11-27 VITALS
OXYGEN SATURATION: 100 % | DIASTOLIC BLOOD PRESSURE: 70 MMHG | RESPIRATION RATE: 18 BRPM | TEMPERATURE: 98 F | HEART RATE: 103 BPM | SYSTOLIC BLOOD PRESSURE: 114 MMHG | WEIGHT: 134.04 LBS | HEIGHT: 64 IN

## 2024-11-27 DIAGNOSIS — Z98.891 HISTORY OF UTERINE SCAR FROM PREVIOUS SURGERY: Chronic | ICD-10-CM

## 2024-11-27 DIAGNOSIS — Z34.83 ENCOUNTER FOR SUPERVISION OF OTHER NORMAL PREGNANCY, THIRD TRIMESTER: ICD-10-CM

## 2024-11-27 PROBLEM — Z78.9 OTHER SPECIFIED HEALTH STATUS: Chronic | Status: INACTIVE | Noted: 2023-05-19 | Resolved: 2024-11-27

## 2024-11-27 LAB
HCV AB S/CO SERPL IA: 0.08 S/CO — SIGNIFICANT CHANGE UP (ref 0–0.99)
HCV AB SERPL-IMP: SIGNIFICANT CHANGE UP
T PALLIDUM AB TITR SER: NEGATIVE — SIGNIFICANT CHANGE UP

## 2024-11-27 PROCEDURE — 86923 COMPATIBILITY TEST ELECTRIC: CPT

## 2024-11-27 PROCEDURE — 86900 BLOOD TYPING SEROLOGIC ABO: CPT

## 2024-11-27 PROCEDURE — 36415 COLL VENOUS BLD VENIPUNCTURE: CPT

## 2024-11-27 PROCEDURE — 86850 RBC ANTIBODY SCREEN: CPT

## 2024-11-27 PROCEDURE — 85730 THROMBOPLASTIN TIME PARTIAL: CPT

## 2024-11-27 PROCEDURE — 86780 TREPONEMA PALLIDUM: CPT

## 2024-11-27 PROCEDURE — 59514 CESAREAN DELIVERY ONLY: CPT | Mod: U9

## 2024-11-27 PROCEDURE — G0463: CPT

## 2024-11-27 PROCEDURE — 85610 PROTHROMBIN TIME: CPT

## 2024-11-27 PROCEDURE — 86901 BLOOD TYPING SEROLOGIC RH(D): CPT

## 2024-11-27 PROCEDURE — 85027 COMPLETE CBC AUTOMATED: CPT

## 2024-11-27 PROCEDURE — 80048 BASIC METABOLIC PNL TOTAL CA: CPT

## 2024-11-27 RX ORDER — HEPARIN SODIUM,PORCINE 1000/ML
5000 VIAL (ML) INJECTION EVERY 12 HOURS
Refills: 0 | Status: DISCONTINUED | OUTPATIENT
Start: 2024-11-28 | End: 2024-11-29

## 2024-11-27 RX ORDER — CEFAZOLIN SODIUM 10 G
2000 VIAL (EA) INJECTION ONCE
Refills: 0 | Status: COMPLETED | OUTPATIENT
Start: 2024-11-27 | End: 2024-11-27

## 2024-11-27 RX ORDER — OXYCODONE HYDROCHLORIDE 30 MG/1
5 TABLET ORAL
Refills: 0 | Status: DISCONTINUED | OUTPATIENT
Start: 2024-11-28 | End: 2024-11-29

## 2024-11-27 RX ORDER — NALOXONE HCL 0.4 MG/ML
0.1 AMPUL (ML) INJECTION
Refills: 0 | Status: DISCONTINUED | OUTPATIENT
Start: 2024-11-27 | End: 2024-11-29

## 2024-11-27 RX ORDER — CHLORHEXIDINE GLUCONATE 1.2 MG/ML
1 RINSE ORAL DAILY
Refills: 0 | Status: DISCONTINUED | OUTPATIENT
Start: 2024-11-27 | End: 2024-11-27

## 2024-11-27 RX ORDER — KETOROLAC TROMETHAMINE 30 MG/ML
30 INJECTION INTRAMUSCULAR; INTRAVENOUS EVERY 6 HOURS
Refills: 0 | Status: DISCONTINUED | OUTPATIENT
Start: 2024-11-27 | End: 2024-11-28

## 2024-11-27 RX ORDER — DIPHENHYDRAMINE HCL 25 MG
25 CAPSULE ORAL EVERY 6 HOURS
Refills: 0 | Status: COMPLETED | OUTPATIENT
Start: 2024-11-27 | End: 2025-10-26

## 2024-11-27 RX ORDER — SIMETHICONE 125 MG
80 CAPSULE ORAL EVERY 4 HOURS
Refills: 0 | Status: DISCONTINUED | OUTPATIENT
Start: 2024-11-27 | End: 2024-11-29

## 2024-11-27 RX ORDER — ONDANSETRON HYDROCHLORIDE 4 MG/1
4 TABLET, FILM COATED ORAL EVERY 6 HOURS
Refills: 0 | Status: DISCONTINUED | OUTPATIENT
Start: 2024-11-27 | End: 2024-11-29

## 2024-11-27 RX ORDER — INFLUENZA VIRUS VACCINE 15; 15; 15; 15 UG/.5ML; UG/.5ML; UG/.5ML; UG/.5ML
0.5 SUSPENSION INTRAMUSCULAR ONCE
Refills: 0 | Status: DISCONTINUED | OUTPATIENT
Start: 2024-11-27 | End: 2024-11-29

## 2024-11-27 RX ORDER — LANOLIN 72 %
1 OINTMENT (GRAM) TOPICAL EVERY 6 HOURS
Refills: 0 | Status: DISCONTINUED | OUTPATIENT
Start: 2024-11-27 | End: 2024-11-29

## 2024-11-27 RX ORDER — TETANUS TOXOID, REDUCED DIPHTHERIA TOXOID AND ACELLULAR PERTUSSIS VACCINE, ADSORBED 5; 2.5; 8; 8; 2.5 [IU]/.5ML; [IU]/.5ML; UG/.5ML; UG/.5ML; UG/.5ML
0.5 SUSPENSION INTRAMUSCULAR ONCE
Refills: 0 | Status: DISCONTINUED | OUTPATIENT
Start: 2024-11-27 | End: 2024-11-29

## 2024-11-27 RX ORDER — DEXAMETHASONE 1.5 MG/1
4 TABLET ORAL EVERY 6 HOURS
Refills: 0 | Status: DISCONTINUED | OUTPATIENT
Start: 2024-11-27 | End: 2024-11-29

## 2024-11-27 RX ORDER — 0.9 % SODIUM CHLORIDE 0.9 %
1000 INTRAVENOUS SOLUTION INTRAVENOUS
Refills: 0 | Status: DISCONTINUED | OUTPATIENT
Start: 2024-11-27 | End: 2024-11-27

## 2024-11-27 RX ORDER — 0.9 % SODIUM CHLORIDE 0.9 %
1000 INTRAVENOUS SOLUTION INTRAVENOUS
Refills: 0 | Status: DISCONTINUED | OUTPATIENT
Start: 2024-11-27 | End: 2024-11-29

## 2024-11-27 RX ORDER — ACETAMINOPHEN 500MG 500 MG/1
975 TABLET, COATED ORAL
Refills: 0 | Status: DISCONTINUED | OUTPATIENT
Start: 2024-11-28 | End: 2024-11-29

## 2024-11-27 RX ORDER — TRISODIUM CITRATE DIHYDRATE AND CITRIC ACID MONOHYDRATE 500; 334 MG/5ML; MG/5ML
30 SOLUTION ORAL ONCE
Refills: 0 | Status: COMPLETED | OUTPATIENT
Start: 2024-11-27 | End: 2024-11-27

## 2024-11-27 RX ORDER — FAMOTIDINE 20 MG/1
20 TABLET, FILM COATED ORAL ONCE
Refills: 0 | Status: COMPLETED | OUTPATIENT
Start: 2024-11-27 | End: 2024-11-27

## 2024-11-27 RX ORDER — IBUPROFEN 200 MG
600 TABLET ORAL EVERY 6 HOURS
Refills: 0 | Status: COMPLETED | OUTPATIENT
Start: 2024-11-28 | End: 2025-10-27

## 2024-11-27 RX ORDER — DIPHENHYDRAMINE HCL 25 MG
25 CAPSULE ORAL EVERY 6 HOURS
Refills: 0 | Status: DISCONTINUED | OUTPATIENT
Start: 2024-11-27 | End: 2024-11-29

## 2024-11-27 RX ADMIN — Medication 100 MILLIGRAM(S): at 11:12

## 2024-11-27 RX ADMIN — KETOROLAC TROMETHAMINE 30 MILLIGRAM(S): 30 INJECTION INTRAMUSCULAR; INTRAVENOUS at 18:00

## 2024-11-27 RX ADMIN — KETOROLAC TROMETHAMINE 30 MILLIGRAM(S): 30 INJECTION INTRAMUSCULAR; INTRAVENOUS at 13:37

## 2024-11-27 RX ADMIN — Medication 80 MILLIGRAM(S): at 17:59

## 2024-11-27 RX ADMIN — Medication 80 MILLIGRAM(S): at 21:36

## 2024-11-27 RX ADMIN — Medication 5000 UNIT(S): at 23:57

## 2024-11-27 RX ADMIN — TRISODIUM CITRATE DIHYDRATE AND CITRIC ACID MONOHYDRATE 30 MILLILITER(S): 500; 334 SOLUTION ORAL at 10:00

## 2024-11-27 RX ADMIN — Medication 200 MILLILITER(S): at 09:52

## 2024-11-27 RX ADMIN — FAMOTIDINE 20 MILLIGRAM(S): 20 TABLET, FILM COATED ORAL at 10:00

## 2024-11-27 RX ADMIN — CHLORHEXIDINE GLUCONATE 1 APPLICATION(S): 1.2 RINSE ORAL at 10:00

## 2024-11-27 RX ADMIN — Medication 25 MILLIGRAM(S): at 23:57

## 2024-11-27 RX ADMIN — Medication 42 MILLIUNIT(S)/MIN: at 13:14

## 2024-11-27 RX ADMIN — KETOROLAC TROMETHAMINE 30 MILLIGRAM(S): 30 INJECTION INTRAMUSCULAR; INTRAVENOUS at 18:13

## 2024-11-27 RX ADMIN — KETOROLAC TROMETHAMINE 30 MILLIGRAM(S): 30 INJECTION INTRAMUSCULAR; INTRAVENOUS at 23:57

## 2024-11-27 RX ADMIN — KETOROLAC TROMETHAMINE 30 MILLIGRAM(S): 30 INJECTION INTRAMUSCULAR; INTRAVENOUS at 13:45

## 2024-11-27 NOTE — OB PROVIDER H&P - NSHPPHYSICALEXAM_GEN_ALL_CORE
Vital Signs Last 24 Hrs  T(C): 36.5 (11-27-24 @ 09:15), Max: 36.5 (11-27-24 @ 09:15)  T(F): 97.7 (11-27-24 @ 09:15), Max: 97.7 (11-27-24 @ 09:15)  HR: 103 (11-27-24 @ 09:15) (103 - 103)  BP: 114/70 (11-27-24 @ 09:15) (114/70 - 114/70)  BP(mean): --  RR: 18 (11-27-24 @ 09:15) (18 - 18)  SpO2: 100% (11-27-24 @ 09:15) (100% - 100%)    gen: well appearing  abd; gravid, soft, non tender  sve def     nst cat 1  toco q irregular    limited bedside sono: cephalic

## 2024-11-27 NOTE — OB RN PATIENT PROFILE - FALL HARM RISK - UNIVERSAL INTERVENTIONS
Bed in lowest position, wheels locked, appropriate side rails in place/Call bell, personal items and telephone in reach/Instruct patient to call for assistance before getting out of bed or chair/Non-slip footwear when patient is out of bed/[place] to call system/Physically safe environment - no spills, clutter or unnecessary equipment/Purposeful Proactive Rounding/Room/bathroom lighting operational, light cord in reach

## 2024-11-27 NOTE — OB PROVIDER H&P - PRO INTERPRETER NEED 2
H&P reviewed. The patient was examined and there are no changes to the H&P.   Risks discussed included but not limited to pain, bleeding, infection, damaging surrounding structures and need for further procedures/failure of procedure.  Denied anticoagulation        
Of note patient already receiving antibiotic cephalosporin regimen      
English

## 2024-11-27 NOTE — OB RN PATIENT PROFILE - PHONE #
Physical Exam:   Gen: well appearing,  HEENT: NCAT, EOMI, dog bite to L tragus  CV: RR  RESP: equal chest rise  Ext: No gross deformities  Neuro: awake and alert, MAEE  Skin: wwp no rashes, CR <2
[phone]

## 2024-11-27 NOTE — OB RN DELIVERY SUMMARY - NS_SEPSISRSKCALC_OBGYN_ALL_OB_FT
EOS calculated successfully. EOS Risk Factor: 0.5/1000 live births ([facility] national incidence); GA=39w2d; Temp=97.7; ROM=0.017; GBS='Negative'; Antibiotics='Broad spectrum antibiotics 2-3.9 hrs prior to birth'

## 2024-11-27 NOTE — OB RN DELIVERY SUMMARY - NSSELHIDDEN_OBGYN_ALL_OB_FT
[NS_DeliveryAttending1_OBGYN_ALL_OB_FT:[id number]=],[NS_DeliveryAssist1_OBGYN_ALL_OB_FT:[id number]=]

## 2024-11-27 NOTE — OB PROVIDER H&P - HISTORY OF PRESENT ILLNESS
33 yo  @ 39w2d  presents to triage for scheduled repeat .   Patient admits to fetal movement  denies vaginal bleeding, leakage of fluid or contractions pain  pnc with Dr Mcgrath,  anemia in pregnancy  pobhx 2021 latosha c/s for fetal distress, failed miol for IUGR girl 6lb4oz  pgynhx: lmp 2024 denies abn pap, fibroids, cyst or stds  pmedhx: denies  psurghx: denies   allergies: denies  social:  denies smoking, alcohol use or recreational drug use

## 2024-11-27 NOTE — OB PROVIDER H&P - ASSESSMENT
a/p sihemanth @ 39w2d scheduled repeat   -admit  - pst done  - 2 units ordered on hold  - ancef  - consent at bedside  Dr Alcides jones   nst reviewed by DR Garza

## 2024-11-27 NOTE — OB RN INTRAOPERATIVE NOTE - NSSELHIDDEN_OBGYN_ALL_OB_FT
[NS_DeliveryAttending1_OBGYN_ALL_OB_FT:[id number]=],[NS_DeliveryAssist1_OBGYN_ALL_OB_FT:[id number]=]

## 2024-11-27 NOTE — OB PROVIDER DELIVERY SUMMARY - NSSELHIDDEN_OBGYN_ALL_OB_FT
Office Consent to Operation/Invasive Procedure    Name: Jorge Mckay   YOB: 1958   MRN: [id number]   1. AUTHORIZATION:  I authorize performance on the patient of the following surgical operation/invasive procedure under the direction, supervision and authority of Mei Tejada DO.  Description of operation(s): FLEXIBLE CYSTOSCOPY AND TRANSRECTAL ULTRASOUND    2. NATURE OF TREATMENT:  The nature of my condition, the nature and purpose of the operation/procedure, possible alternative methods of treatment, risks involved, and the possibility of complications have been explained to me. I have had an opportunity to discuss this operation/procedure with the physician and other doctors concerned, and I have received answers to all questions I asked and do hereby assume all risks involved. No guarantee or assurance has been given to me by anyone as to the results that may be obtained.     3. ADDITIONAL PROCEDURES:  I consent to the performance of operations and procedures in addition to or different from those now contemplated, whether or not arising from presently unforeseen conditions, which the above-named doctor or his/her associate or assistants may consider necessary or advisable in the course of the operation.    4. OTHER QUALIFIED PRACTITIONERS:  I have been advised, and I agree, that the operation/procedure may be performed by a team of doctors including one or more attending, doctors, residents and medical students. I consent to these other qualified medical practitioners, who are not physicians, performing important parts of the operation/procedure or the administration of anesthetic agents.    5. ANESTHETIC AGENTS: I understand that anesthetic agents may have serious and even fatal complications. I consent to the administration of such anesthetics/sedatives/medications as may be considered necessary or advisable by the physician responsible for the service.      6. OBSERVATION:  For the purpose of  advancing the educational programs of the facility I consent to the admittance of qualified observers.    7. PHOTOGRAPHY: I consent to the taking and publication of photographs and other reproductions in the course of the operation or procedure for the purpose of advancing education provided that the identify of the patient is not revealed.    8. TISSUE USE:  I authorize the facility to preserve and use for scientific or teaching purposes or otherwise dispose of any dismembered tissue resulting from the procedure and treatment authorized above.    9. INDEPENDENT PHYSICIAN SERVICES: I have been informed and I acknowledge and fully understand that the Physician(s) providing services to me in this facility, such as my personal Physician(s),  Specialty Physician(s), Radiology, Pathology, consulting, and other allied health physicians, are independent contractors and are not employees or agents of this facility, unless otherwise specifically stated. My decision to seek medical care at the facility is not based upon any understanding, representation, advertisement, media campaign, inference, implication or reliance that the physicians who are or will be treating me are employees or agents of the facility.     DO NOT SIGN IF YOU HAVE ANY QUESTIONS  ________________________________________________________________________________________________________________  My signature below constitutes my acknowledgement and agreement that I have read and understand the above, was given an opportunity to discuss this form and ask questions, that all questions were answered to my satisfaction, and I am satisfied I understand the form's contents and significance.    Date: __________________ Time: _________________ Signed: ______________________________________________________        Patient/Legally Authorized Representative ([place] appropriate)     Date: __________________   Time: _________________   Signed:  ______________________________________________________  Witness   Certificate of Interpretation:  I certify that I have read the foregoing to the signor hearof in the ____________________________________________________ language.     Date: ________________ Time: __________________ Signed: _________________________________________________________     Affirmation by Responsible Physician  I have informed the above named patient or Legally Authorized Representative of the medical condition requiring surgical treatment and/or the further diagnostic procedures referred to above. I have explained, consistent with accepted medical judgment, the nature and purposes of the treatment or procedures, the reasonable (1) possible alternatives (2) risks/complications and (3) consequences in the treatment or procedure consented to. I have also given the opportunity to ask questions and have answered any such questions.     Date:________________ Time: __________________ Signed: _________________________________________________________      [NS_DeliveryAttending1_OBGYN_ALL_OB_FT:[id number]=],[NS_DeliveryAssist1_OBGYN_ALL_OB_FT:[id number]=]

## 2024-11-27 NOTE — OB RN PATIENT PROFILE - NS TRANSFER DISPOSITION PATIENT BELONGINGS
Inpatient Rehabilitation - Physical Therapy Treatment Note       [facility]     Patient Name: Omar Choudhary  : 1947  MRN: [id number]    Today's Date: 10/10/2022                    Admit Date: 10/8/2022      Visit Dx:     ICD-10-CM ICD-9-CM   1. Impaired gait and mobility  R26.89 781.2       Patient Active Problem List   Diagnosis   • Benign prostatic hyperplasia   • Bursitis   • Depression   • Diverticulosis   • Family history of malignant neoplasm of breast   • Hyperlipidemia   • Hypertension   • Internal derangement of right knee   • Knee effusion   • Asthma   • Obstructive sleep apnea syndrome   • Osteoarthritis   • Osteopenia   • Pain in knee   • Postnasal drip   • Prepatellar bursitis   • Sciatica of right side   • Diaphragm paralysis   • Spinal stenosis of lumbar region with neurogenic claudication   • Degenerative lumbar spinal stenosis   • Anxiety   • Pinguecula of right eye   • Lumbar radiculopathy, chronic       Past Medical History:   Diagnosis Date   • Allergic    • Arthritis    • Asthma    • Carpal tunnel syndrome     no pain   • Cataract    • Depression    • Family history of malignant neoplasm of breast 2019   • Hemidiaphragm paralysis     Left   • Hyperlipidemia 2019   • Hypertension 2012   • Leg pain, right    • Low back pain    • Neuropathy     feet   • Osteopenia    • Poor balance    • Reactive airway disease 01/15/2016   • Shortness of breath    • Sleep apnea, obstructive     CPAP- to bring dos       Past Surgical History:   Procedure Laterality Date   • KNEE ARTHROSCOPY W/ ACL RECONSTRUCTION Right 2019   • MOUTH SURGERY         PT ASSESSMENT (last 12 hours)     IRF PT Evaluation and Treatment     Row Name 10/10/22 0924          PT Time and Intention    Document Type daily treatment  -DP     Mode of Treatment individual therapy;physical therapy  -DP     Patient/Family/Caregiver Comments/Observations Pt sitting up in w/c with OT in gym  -DP     Row Name 10/10/22  0924          General Information    Patient Profile Reviewed yes  -DP     Existing Precautions/Restrictions spinal;fall  -DP     Row Name 10/10/22 0924          Pain Assessment    Pretreatment Pain Rating 8/10  -DP     Posttreatment Pain Rating 8/10  -DP     Pain Location - Side/Orientation other (see comments)  -DP     Pain Location incisional  -DP     Pain Location - back  -DP     Row Name 10/10/22 0924          Cognition/Psychosocial    Affect/Mental Status (Cognition) WNL  -DP     Orientation Status (Cognition) oriented x 4  -DP     Follows Commands (Cognition) follows two-step commands;over 90% accuracy;verbal cues/prompting required  -DP     Personal Safety Interventions safety round/check completed;elopement precautions initiated;gait belt;fall prevention program maintained;muscle strengthening facilitated;nonskid shoes/slippers when out of bed;supervised activity  -DP     Row Name 10/10/22 0924          Bed Mobility    Bed Mobility rolling left;rolling right;supine-sit;sit-supine  -DP     Rolling Left [place] (Bed Mobility) standby assist;verbal cues  -DP     Rolling Right [place] (Bed Mobility) standby assist;verbal cues  -DP     Supine-Sit [place] (Bed Mobility) standby assist  -DP     Sit-Supine [place] (Bed Mobility) standby assist  -DP     Row Name 10/10/22 0924          Transfer Assessment/Treatment    Transfers sit-stand transfer;stand-sit transfer;bed-chair transfer;chair-bed transfer  -DP     Row Name 10/10/22 0924          Bed-Chair Transfer    Bed-Chair [place] (Transfers) standby assist  -DP     Assistive Device (Bed-Chair Transfers) walker, front-wheeled  -DP     Row Name 10/10/22 0924          Chair-Bed Transfer    Chair-Bed [place] (Transfers) standby assist  -DP     Assistive Device (Chair-Bed Transfers) walker, front-wheeled  -DP     Row Name 10/10/22 0924          Sit-Stand Transfer    Sit-Stand [place] (Transfers) contact guard  -DP     Assistive Device  (Sit-Stand Transfers) walker, front-wheeled  -DP     Row Name 10/10/22 0924          Stand-Sit Transfer    Stand-Sit [place] (Transfers) contact guard  -DP     Assistive Device (Stand-Sit Transfers) walker, front-wheeled  -DP     Row Name 10/10/22 0924          Toilet Transfer    Type (Toilet Transfer) sit-stand;stand-sit  -DP     [place] Level (Toilet Transfer) standby assist  -DP     Assistive Device (Toilet Transfer) walker, front-wheeled  -DP     Row Name 10/10/22 0924          Gait/Stairs (Locomotion)    [place] Level (Gait) contact guard  -DP     Assistive Device (Gait) walker, front-wheeled  -DP     Distance in Feet (Gait) 80'x1, 50'x2, 160'x1  -DP     Deviations/Abnormal Patterns (Gait) bilateral deviations;gait speed decreased;stride length decreased;weight shifting decreased  -DP     Bilateral Gait Deviations forward flexed posture;heel strike decreased  -DP     [place] Level (Stairs) contact guard  -DP     Handrail Location (Stairs) both sides  -DP     Number of Steps (Stairs) 8  -DP     Ascending Technique (Stairs) step-over-step  -DP     Descending Technique (Stairs) step-over-step  -DP     Comment, (Gait/Stairs) Pt able to ambulate up to 160 feet with CGA and navigate 8 stairs with BUE for supoort and is limited by pain vs endurance  -DP     Row Name 10/10/22 0924          Safety Issues, Functional Mobility    Impairments Affecting Function (Mobility) balance;postural/trunk control;endurance/activity tolerance;strength;pain  -DP     Row Name 10/10/22 0924          Motor Skills    Therapeutic Exercise hip;knee;ankle;aerobic  -DP     Row Name 10/10/22 0924          Hip (Therapeutic Exercise)    Hip (Therapeutic Exercise) isometric exercises  -DP     Hip Isometrics (Therapeutic Exercise) bilateral;aBduction;aDduction;10 repetitions;3 second hold;other (see comments)  abduction required manual resistance and adduction used ball between pt's knees  -DP     Row Name 10/10/22 0924           Knee (Therapeutic Exercise)    Knee (Therapeutic Exercise) AROM (active range of motion);strengthening exercise  -DP     Knee AROM (Therapeutic Exercise) bilateral;LAQ (long arc quad);10 repetitions  -DP     Knee Strengthening (Therapeutic Exercise) bilateral;hamstring curls;resistance band;red;10 repetitions  -DP     Row Name 10/10/22 0924          Ankle (Therapeutic Exercise)    Ankle (Therapeutic Exercise) AROM (active range of motion)  -DP     Ankle AROM (Therapeutic Exercise) bilateral;dorsiflexion;plantarflexion;15 repititions  -DP     Row Name 10/10/22 0924          Aerobic Exercise    Type (Aerobic Exercise) recumbent elliptical   -DP     Time Performed (Aerobic Exercise) 4 mins  -DP     Comment, Aerobic Exercise (Therapeutic Exercise) LE's only on WL 3  -DP     Row Name 10/10/22 0924          Positioning and Restraints    Pre-Treatment Position sitting in chair/recliner  -DP     Post Treatment Position wheelchair  -DP     In Wheelchair with OT  -DP           User Key  (r) = Recorded By, (t) = Taken By, (c) = Cosigned By    Initials Name Provider Type    DP Melo Astorga, PT Physical Therapist              Wound 10/04/22 0849 Left lumbar spine Incision (Active)   Dressing Appearance intact;dry 10/10/22 0739   Closure Sutures 10/10/22 0739   Base dry;scab 10/10/22 0739   Drainage Amount none 10/10/22 0739       Wound 10/05/22 1411 Other (See comments) lower lumbar spine Incision (Active)   Dressing Appearance dry;intact 10/10/22 0000   Closure Sutures 10/10/22 0739   Base clean;dressing in place, unable to visualize 10/10/22 0739   Drainage Amount none 10/10/22 0739   Dressing Care other (see comments) 10/10/22 0739     Physical Therapy Education     Title: PT OT SLP Therapies (Done)     Topic: Physical Therapy (Done)     Point: Mobility training (Done)     Learning Progress Summary           Patient Acceptance, E,D, VU,DU by BRITNEY at 10/10/2022 1548    Acceptance, E,TB, VU,NR by ADE at 10/9/2022 1205                    Point: Home exercise program (Done)     Learning Progress Summary           Patient Acceptance, E,D, VU,DU by DP at 10/10/2022 1548    Acceptance, E,TB, VU,NR by ADE at 10/9/2022 1205                   Point: Body mechanics (Done)     Learning Progress Summary           Patient Acceptance, E,D, VU,DU by DP at 10/10/2022 1548    Acceptance, E,TB, VU,NR by ADE at 10/9/2022 1205                   Point: Precautions (Done)     Learning Progress Summary           Patient Acceptance, E,D, VU,DU by DP at 10/10/2022 1548    Acceptance, E,TB, VU,NR by ADE at 10/9/2022 1205                               User Key     Initials Effective Dates Name Provider Type Discipline     06/16/21 -  Marion Farias, PT Physical Therapist PT    DP 08/24/21 -  Melo Astorga PT Physical Therapist PT                PT Recommendation and Plan                          Time Calculation:      PT Charges     Row Name 10/10/22 1553 10/10/22 1551          Time Calculation    Start Time 1300  -DP 0900  -DP     Stop Time 1330  -DP 1000  -DP     Time Calculation (min) 30 min  -DP 60 min  -DP     PT Received On -- 10/10/22  -DP     PT - Next Appointment -- 10/11/22  -DP        Time Calculation- PT    Total Timed Code Minutes- PT 30 minute(s)  -DP 60 minute(s)  -DP           User Key  (r) = Recorded By, (t) = Taken By, (c) = Cosigned By    Initials Name Provider Type    DP Melo Astorga, PT Physical Therapist                Therapy Charges for Today     Code Description Service Date Service Provider Modifiers Qty    66629039436  PT THERAPEUTIC ACT EA 15 MIN 10/10/2022 Melo Astorga PT GP 3    80627974865 HC PT THER PROC EA 15 MIN 10/10/2022 Melo Astorga, PT GP 3        Patient was wearing a face mask during this therapy encounter. Therapist used appropriate personal protective equipment including mask and gloves.  Mask used was standard procedure mask. Appropriate PPE was worn during the entire therapy session. Hand hygiene was  completed before and after therapy session. Patient is not in enhanced droplet precautions.       PT G-Codes  AM-PAC 6 Clicks Score (PT): 20      Melo Astorga, PT  10/10/2022     given to family

## 2024-11-27 NOTE — OB NEONATOLOGY/PEDIATRICIAN DELIVERY SUMMARY - NSPEDSNEONOTESA_OBGYN_ALL_OB_FT
Neonatologist (I) was called to attend this scheduled repeat . Maternal HIV negative, HBsAg negative, RPR non-reactive, Rubella immune, GBS negative. AROM at delivery. The infant presented cephalic, emerged with good tone, spontaneous cry. Cord was clamped at 60 seconds of life. The infant was then brought to the radiant warmer where he was warmed, dried. Exam overall benign; notable for penile chordae. HR >100 bpm, the infant remained well appearing, with normal WOB. O2Sat stayed between 85 and 92% from 5 to 10 minutes of life. 10Fr catheter suction removed large amount of thick white mucus from the oropharynx. Afterwards O2Sat overall 90-90%. Transition in WBN. Neonatologist (I) was called to attend this scheduled repeat . Maternal HIV negative, HBsAg negative, RPR non-reactive, Rubella immune, GBS negative. AROM at delivery. The infant presented cephalic, emerged with good tone, spontaneous cry. Cord was clamped at 60 seconds of life. The infant was then brought to the radiant warmer where he was warmed, dried. Exam overall benign, normal S2 S2 without murmurs, lung sounds were clear; notable for penile chordae. HR >100 bpm, the infant remained well appearing, with normal WOB. O2Sat stayed between 85 and 92% from 5 to 10 minutes of life. 10Fr catheter suction removed large amount of thick white mucus from the oropharynx that were visible at the pharynx. Afterwards O2Sat overall 90-90%. Transition in WBN.

## 2024-11-28 ENCOUNTER — TRANSCRIPTION ENCOUNTER (OUTPATIENT)
Age: 34
End: 2024-11-28

## 2024-11-28 LAB
BASOPHILS # BLD AUTO: 0.04 K/UL — SIGNIFICANT CHANGE UP (ref 0–0.2)
BASOPHILS NFR BLD AUTO: 0.5 % — SIGNIFICANT CHANGE UP (ref 0–2)
EOSINOPHIL # BLD AUTO: 0.12 K/UL — SIGNIFICANT CHANGE UP (ref 0–0.5)
EOSINOPHIL NFR BLD AUTO: 1.5 % — SIGNIFICANT CHANGE UP (ref 0–6)
HCT VFR BLD CALC: 31.2 % — LOW (ref 34.5–45)
HGB BLD-MCNC: 10.6 G/DL — LOW (ref 11.5–15.5)
IMM GRANULOCYTES NFR BLD AUTO: 0.5 % — SIGNIFICANT CHANGE UP (ref 0–0.9)
LYMPHOCYTES # BLD AUTO: 1.45 K/UL — SIGNIFICANT CHANGE UP (ref 1–3.3)
LYMPHOCYTES # BLD AUTO: 18.3 % — SIGNIFICANT CHANGE UP (ref 13–44)
MCHC RBC-ENTMCNC: 30.2 PG — SIGNIFICANT CHANGE UP (ref 27–34)
MCHC RBC-ENTMCNC: 34 G/DL — SIGNIFICANT CHANGE UP (ref 32–36)
MCV RBC AUTO: 88.9 FL — SIGNIFICANT CHANGE UP (ref 80–100)
MONOCYTES # BLD AUTO: 0.53 K/UL — SIGNIFICANT CHANGE UP (ref 0–0.9)
MONOCYTES NFR BLD AUTO: 6.7 % — SIGNIFICANT CHANGE UP (ref 2–14)
NEUTROPHILS # BLD AUTO: 5.75 K/UL — SIGNIFICANT CHANGE UP (ref 1.8–7.4)
NEUTROPHILS NFR BLD AUTO: 72.5 % — SIGNIFICANT CHANGE UP (ref 43–77)
NRBC # BLD: 0 /100 WBCS — SIGNIFICANT CHANGE UP (ref 0–0)
PLATELET # BLD AUTO: 218 K/UL — SIGNIFICANT CHANGE UP (ref 150–400)
RBC # BLD: 3.51 M/UL — LOW (ref 3.8–5.2)
RBC # FLD: 15.7 % — HIGH (ref 10.3–14.5)
T PALLIDUM AB TITR SER: NEGATIVE — SIGNIFICANT CHANGE UP
WBC # BLD: 7.93 K/UL — SIGNIFICANT CHANGE UP (ref 3.8–10.5)
WBC # FLD AUTO: 7.93 K/UL — SIGNIFICANT CHANGE UP (ref 3.8–10.5)

## 2024-11-28 RX ORDER — IBUPROFEN 200 MG
1 TABLET ORAL
Qty: 120 | Refills: 0
Start: 2024-11-28 | End: 2024-12-27

## 2024-11-28 RX ORDER — IBUPROFEN 200 MG
600 TABLET ORAL EVERY 6 HOURS
Refills: 0 | Status: DISCONTINUED | OUTPATIENT
Start: 2024-11-28 | End: 2024-11-29

## 2024-11-28 RX ORDER — FERROUS SULFATE 325(65) MG
1 TABLET ORAL
Qty: 60 | Refills: 0
Start: 2024-11-28 | End: 2024-12-27

## 2024-11-28 RX ORDER — MULTIVIT WITH MINERALS/LUTEIN
1 TABLET ORAL
Qty: 30 | Refills: 0
Start: 2024-11-28 | End: 2024-12-27

## 2024-11-28 RX ORDER — DOCUSATE SODIUM 100 MG
1 CAPSULE ORAL
Qty: 60 | Refills: 0
Start: 2024-11-28 | End: 2024-12-27

## 2024-11-28 RX ADMIN — Medication 600 MILLIGRAM(S): at 17:29

## 2024-11-28 RX ADMIN — KETOROLAC TROMETHAMINE 30 MILLIGRAM(S): 30 INJECTION INTRAMUSCULAR; INTRAVENOUS at 06:02

## 2024-11-28 RX ADMIN — Medication 80 MILLIGRAM(S): at 17:29

## 2024-11-28 RX ADMIN — Medication 80 MILLIGRAM(S): at 06:01

## 2024-11-28 RX ADMIN — Medication 600 MILLIGRAM(S): at 13:26

## 2024-11-28 RX ADMIN — Medication 5000 UNIT(S): at 12:27

## 2024-11-28 RX ADMIN — KETOROLAC TROMETHAMINE 30 MILLIGRAM(S): 30 INJECTION INTRAMUSCULAR; INTRAVENOUS at 07:02

## 2024-11-28 RX ADMIN — ACETAMINOPHEN 500MG 975 MILLIGRAM(S): 500 TABLET, COATED ORAL at 21:30

## 2024-11-28 RX ADMIN — ACETAMINOPHEN 500MG 975 MILLIGRAM(S): 500 TABLET, COATED ORAL at 11:13

## 2024-11-28 RX ADMIN — ACETAMINOPHEN 500MG 975 MILLIGRAM(S): 500 TABLET, COATED ORAL at 03:45

## 2024-11-28 RX ADMIN — ACETAMINOPHEN 500MG 975 MILLIGRAM(S): 500 TABLET, COATED ORAL at 15:40

## 2024-11-28 RX ADMIN — ACETAMINOPHEN 500MG 975 MILLIGRAM(S): 500 TABLET, COATED ORAL at 10:13

## 2024-11-28 RX ADMIN — ACETAMINOPHEN 500MG 975 MILLIGRAM(S): 500 TABLET, COATED ORAL at 22:30

## 2024-11-28 RX ADMIN — Medication 600 MILLIGRAM(S): at 18:29

## 2024-11-28 RX ADMIN — Medication 80 MILLIGRAM(S): at 12:27

## 2024-11-28 RX ADMIN — Medication 125 MILLILITER(S): at 03:00

## 2024-11-28 RX ADMIN — KETOROLAC TROMETHAMINE 30 MILLIGRAM(S): 30 INJECTION INTRAMUSCULAR; INTRAVENOUS at 00:55

## 2024-11-28 RX ADMIN — Medication 600 MILLIGRAM(S): at 12:26

## 2024-11-28 RX ADMIN — ACETAMINOPHEN 500MG 975 MILLIGRAM(S): 500 TABLET, COATED ORAL at 02:46

## 2024-11-28 RX ADMIN — ACETAMINOPHEN 500MG 975 MILLIGRAM(S): 500 TABLET, COATED ORAL at 16:40

## 2024-11-28 NOTE — PROGRESS NOTE ADULT - SUBJECTIVE AND OBJECTIVE BOX
Patient seen resting comfortably.  No new complaints.  Denies HA, CP, SOB, N/V/D, dizziness, palpitations, worsening abdominal pain, worsening vaginal bleeding, or any other concerns. + Ambulation, + void without difficulty, + flatus - tolerating regular diet. Attempting breastfeeding.     Vital Signs Last 24 Hrs  T(C): 36.7 (2024 10:03), Max: 36.7 (2024 00:44)  T(F): 98.1 (2024 10:03), Max: 98.1 (2024 10:03)  HR: 92 (2024 10:03) (56 - 92)  BP: 94/63 (2024 10:) (90/40 - 110/74)  RR: 16 (2024 10:03) (15 - 18)  SpO2: 98% (2024 10:03) (97% - 100%)    Parameters below as of 2024 05:08  Patient On (Oxygen Delivery Method): room air        Gen: A&O x 3, NAD  Chest: CTABL  Cardiac: S1+S2+ RRR  Breast: Soft, nontender, nonengorged  Abdomen: Nontender, nondistended, +BS, Incision C/D/I  Gyn: Minimal bleeding  Extremities: Nontender, DTRS 2+, no worsening edema, venodynes intact                          10.6   7.93  )-----------( 218      ( 2024 06:38 )             31.2       A/P:  Patient s/p  section, POD #1.     -Pain management as needed  -Advance as per protocol  -OOB and ambulate  -Repeat CBC in am  -Encourage breastfeeding

## 2024-11-28 NOTE — DISCHARGE NOTE OB - MATERIALS PROVIDED
Vaccinations/[facility]  Screening Program/  Immunization Record/Breastfeeding Log/Bottle Feeding Log/Breastfeeding Mother’s Support Group Information/Guide to Postpartum Care/[facility] Hearing Screen Program/Back To Sleep Handout/Shaken Baby Prevention Handout/Breastfeeding Guide and Packet/Birth Certificate Instructions/Discharge Medication Information for Patients and Families Pocket Guide/Letter of Medical Neccessity/Prescription for Breast Pump

## 2024-11-28 NOTE — DISCHARGE NOTE OB - FLU SEASON?
Problem: Fall Injury Risk  Goal: Absence of Fall and Fall-Related Injury  Outcome: Ongoing, Progressing  Intervention: Identify and Manage Contributors  Flowsheets (Taken 6/25/2022 0143)  Self-Care Promotion: BADL personal objects within reach  Medication Review/Management: medications reviewed     Problem: Skin Injury Risk Increased  Goal: Skin Health and Integrity  Outcome: Ongoing, Progressing     Problem: Fall Injury Risk  Goal: Absence of Fall and Fall-Related Injury  Outcome: Ongoing, Progressing  Intervention: Identify and Manage Contributors  Flowsheets (Taken 6/25/2022 0143)  Self-Care Promotion: BADL personal objects within reach  Medication Review/Management: medications reviewed     Problem: Fall Injury Risk  Goal: Absence of Fall and Fall-Related Injury  Intervention: Identify and Manage Contributors  Flowsheets (Taken 6/25/2022 0143)  Self-Care Promotion: BADL personal objects within reach  Medication Review/Management: medications reviewed     Problem: Skin Injury Risk Increased  Goal: Skin Health and Integrity  Outcome: Ongoing, Progressing      Yes...

## 2024-11-28 NOTE — CHART NOTE - NSCHARTNOTEFT_GEN_A_CORE
2030  Pt seen at bedside offers no complaints. Denies n/v, cp; sob; palpitations; or dizziness    T(C): 36.3 (11-27-24 @ 20:38), Max: 36.5 (11-27-24 @ 09:15)  HR: 85 (11-27-24 @ 20:38) (56 - 103)  BP: 98/63 (11-27-24 @ 20:38) (90/40 - 114/70)  RR: 18 (11-27-24 @ 20:38) (15 - 18)  SpO2: 100% (11-27-24 @ 20:38) (100% - 100%)    abd: soft/nt, fundus firm, dressing in place C/D/I  pelvic: minimal lochia  ext: venodynes in place; no calf pain    a/p POD #0 s/p c/section   cont close monitor   cont post op care  d/w dr. garcia

## 2024-11-28 NOTE — DISCHARGE NOTE OB - IF YOU HAVE SEXUAL INTERCOURSE, PREGNANCY CAN OCCUR. THEREFORE, DISCUSS FAMILY PLANNING OPTIONS WITH YOUR HEALTHCARE PROVIDER
Detail Level: Detailed Add 45246 Cpt? (Important Note: In 2017 The Use Of 52293 Is Being Tracked By Cms To Determine Future Global Period Reimbursement For Global Periods): no Statement Selected

## 2024-11-28 NOTE — DISCHARGE NOTE OB - PATIENT PORTAL LINK FT
You can access the FollowMyHealth Patient Portal offered by [facility] by registering at the following website: [email or web address]. By joining [facility]’s FollowMyHealth portal, you will also be able to view your health information using other applications (apps) compatible with our system.

## 2024-11-28 NOTE — DISCHARGE NOTE OB - CARE PROVIDER_API CALL
Juan C Mcgrath  Obstetrics and Gynecology  [address] [place] [address], [place] [address] [place] [address]  Phone: [phone]  Fax: [phone]  Follow Up Time:

## 2024-11-28 NOTE — DISCHARGE NOTE OB - HOSPITAL COURSE
Pt admitted for repeat  at 39.2 weeks. routine post op care. post op care and breastfeeding instructions provided. normal couse of labor and delivery  Pt admitted for repeat  at 39.2 weeks. routine post op care. post op care and breastfeeding instructions provided. normal course of labor and delivery

## 2024-11-28 NOTE — DISCHARGE NOTE OB - FINANCIAL ASSISTANCE
[facility] provides services at a reduced cost to those who are determined to be eligible through [facility]’s financial assistance program. Information regarding [facility]’s financial assistance program can be found by going to [email or web address] or by calling [phone].

## 2024-11-28 NOTE — DISCHARGE NOTE OB - MEDICATION SUMMARY - MEDICATIONS TO TAKE
I will START or STAY ON the medications listed below when I get home from the hospital:    ibuprofen 600 mg oral tablet  -- 1 tab(s) by mouth every 6 hours  -- Indication: For pain    ferrous sulfate 325 mg (65 mg elemental iron) oral tablet  -- 1 tab(s) by mouth 2 times a day  -- Indication: For supplement    Colace 100 mg oral capsule  -- 1 cap(s) by mouth 2 times a day  -- Indication: For constipation     ascorbic acid 500 mg oral tablet, chewable  -- 1 tab(s) chewed once a day  -- Indication: For supplement

## 2024-11-29 VITALS
OXYGEN SATURATION: 98 % | DIASTOLIC BLOOD PRESSURE: 72 MMHG | HEART RATE: 69 BPM | RESPIRATION RATE: 16 BRPM | TEMPERATURE: 98 F | SYSTOLIC BLOOD PRESSURE: 112 MMHG

## 2024-11-29 LAB
HCT VFR BLD CALC: 29.4 % — LOW (ref 34.5–45)
HGB BLD-MCNC: 10.1 G/DL — LOW (ref 11.5–15.5)
MCHC RBC-ENTMCNC: 30.8 PG — SIGNIFICANT CHANGE UP (ref 27–34)
MCHC RBC-ENTMCNC: 34.4 G/DL — SIGNIFICANT CHANGE UP (ref 32–36)
MCV RBC AUTO: 89.6 FL — SIGNIFICANT CHANGE UP (ref 80–100)
NRBC # BLD: 0 /100 WBCS — SIGNIFICANT CHANGE UP (ref 0–0)
PLATELET # BLD AUTO: 239 K/UL — SIGNIFICANT CHANGE UP (ref 150–400)
RBC # BLD: 3.28 M/UL — LOW (ref 3.8–5.2)
RBC # FLD: 15.9 % — HIGH (ref 10.3–14.5)
WBC # BLD: 8.9 K/UL — SIGNIFICANT CHANGE UP (ref 3.8–10.5)
WBC # FLD AUTO: 8.9 K/UL — SIGNIFICANT CHANGE UP (ref 3.8–10.5)

## 2024-11-29 PROCEDURE — 85025 COMPLETE CBC W/AUTO DIFF WBC: CPT

## 2024-11-29 PROCEDURE — 36415 COLL VENOUS BLD VENIPUNCTURE: CPT

## 2024-11-29 PROCEDURE — 85027 COMPLETE CBC AUTOMATED: CPT

## 2024-11-29 PROCEDURE — 86780 TREPONEMA PALLIDUM: CPT

## 2024-11-29 PROCEDURE — 59050 FETAL MONITOR W/REPORT: CPT

## 2024-11-29 PROCEDURE — 86803 HEPATITIS C AB TEST: CPT

## 2024-11-29 RX ADMIN — ACETAMINOPHEN 500MG 975 MILLIGRAM(S): 500 TABLET, COATED ORAL at 10:12

## 2024-11-29 RX ADMIN — Medication 600 MILLIGRAM(S): at 06:00

## 2024-11-29 RX ADMIN — Medication 600 MILLIGRAM(S): at 07:00

## 2024-11-29 RX ADMIN — Medication 600 MILLIGRAM(S): at 13:00

## 2024-11-29 RX ADMIN — Medication 5000 UNIT(S): at 00:00

## 2024-11-29 RX ADMIN — Medication 600 MILLIGRAM(S): at 00:00

## 2024-11-29 RX ADMIN — Medication 5000 UNIT(S): at 13:00

## 2024-11-29 RX ADMIN — ACETAMINOPHEN 500MG 975 MILLIGRAM(S): 500 TABLET, COATED ORAL at 09:12

## 2024-11-29 RX ADMIN — Medication 600 MILLIGRAM(S): at 01:00

## 2024-11-29 NOTE — PROGRESS NOTE ADULT - PROBLEM SELECTOR PLAN 1
-d/c home   -instructions verbalized  -follow up in 1-2weeks in office for incision check  -d/w dr Mcgrath

## 2024-11-29 NOTE — PROGRESS NOTE ADULT - SUBJECTIVE AND OBJECTIVE BOX
Patient seen at bedside resting comfortably offers no new complaints. + Ambulation, + void without difficulty, + flatus;  + bm;  tolerating regular diet. both breastfeeding and bottle feeding. Denies HA, blurry vision or epigastric pain, CP, SOB, N/V/D,  dizziness, palpitations, worsening vaginal bleeding.     Vital Signs Last 24 Hrs  T(C): 36.8 (29 Nov 2024 06:20), Max: 36.8 (28 Nov 2024 18:07)  T(F): 98.2 (29 Nov 2024 06:20), Max: 98.3 (28 Nov 2024 18:07)  HR: 69 (29 Nov 2024 06:20) (69 - 92)  BP: 112/72 (29 Nov 2024 06:20) (94/63 - 112/72)  RR: 16 (29 Nov 2024 06:20) (16 - 16)  SpO2: 98% (29 Nov 2024 06:20) (98% - 99%)    Parameters below as of 29 Nov 2024 06:20  Patient On (Oxygen Delivery Method): room air      Gen: A&O x 3, NAD  Chest: CTABL  Cardiac: S1, S2, RRR  Breast: Soft, nontender, nonengorged  Abdomen:  soft; Nontender, nondistended, Incision C/D/I steri strips in place   Gyn: Minimal lochia  Extremities: Nontender, DTRS 2+, no worsening edema                          10.1   8.90  )-----------( 239      ( 29 Nov 2024 07:40 )             29.4        Patient seen at bedside resting comfortably offers no new complaints. Pt requesting early discharge home today. + Ambulation, + void without difficulty, + flatus;  + bm;  tolerating regular diet. both breastfeeding and bottle feeding. Denies HA, blurry vision or epigastric pain, CP, SOB, N/V/D,  dizziness, palpitations, worsening vaginal bleeding.     Vital Signs Last 24 Hrs  T(C): 36.8 (29 Nov 2024 06:20), Max: 36.8 (28 Nov 2024 18:07)  T(F): 98.2 (29 Nov 2024 06:20), Max: 98.3 (28 Nov 2024 18:07)  HR: 69 (29 Nov 2024 06:20) (69 - 92)  BP: 112/72 (29 Nov 2024 06:20) (94/63 - 112/72)  RR: 16 (29 Nov 2024 06:20) (16 - 16)  SpO2: 98% (29 Nov 2024 06:20) (98% - 99%)    Parameters below as of 29 Nov 2024 06:20  Patient On (Oxygen Delivery Method): room air      Gen: A&O x 3, NAD  Chest: CTABL  Cardiac: S1, S2, RRR  Breast: Soft, nontender, nonengorged  Abdomen:  soft; Nontender, nondistended, Incision C/D/I steri strips in place   Gyn: Minimal lochia  Extremities: Nontender, DTRS 2+, no worsening edema                          10.1   8.90  )-----------( 239      ( 29 Nov 2024 07:40 )             29.4

## 2024-12-11 ENCOUNTER — NON-APPOINTMENT (OUTPATIENT)
Age: 34
End: 2024-12-11

## 2024-12-11 ENCOUNTER — APPOINTMENT (OUTPATIENT)
Dept: OBGYN | Facility: CLINIC | Age: 34
End: 2024-12-11
Payer: MEDICAID

## 2024-12-11 VITALS
DIASTOLIC BLOOD PRESSURE: 82 MMHG | SYSTOLIC BLOOD PRESSURE: 118 MMHG | OXYGEN SATURATION: 100 % | HEART RATE: 76 BPM | WEIGHT: 114 LBS | BODY MASS INDEX: 20.19 KG/M2

## 2024-12-11 PROBLEM — O99.019 ANEMIA COMPLICATING PREGNANCY, UNSPECIFIED TRIMESTER: Chronic | Status: ACTIVE | Noted: 2024-11-27

## 2024-12-11 PROCEDURE — 99213 OFFICE O/P EST LOW 20 MIN: CPT | Mod: TH

## 2024-12-11 NOTE — OB PROVIDER DELIVERY SUMMARY - NSLOWPPHRISK_OBGYN_A_OB
Is This A New Presentation, Or A Follow-Up?: Rash
Additional History: -\\nPt notes she has cellulitis on this finger from wearing her oura ring.\\nPt has since switched the finger she wears it on and it has improved some.
[place] Pregnancy

## 2024-12-16 ENCOUNTER — NON-APPOINTMENT (OUTPATIENT)
Age: 34
End: 2024-12-16

## 2025-01-06 ENCOUNTER — APPOINTMENT (OUTPATIENT)
Dept: OBGYN | Facility: CLINIC | Age: 35
End: 2025-01-06
Payer: MEDICAID

## 2025-01-06 VITALS
WEIGHT: 113 LBS | DIASTOLIC BLOOD PRESSURE: 79 MMHG | BODY MASS INDEX: 20.02 KG/M2 | SYSTOLIC BLOOD PRESSURE: 129 MMHG | OXYGEN SATURATION: 99 % | HEART RATE: 62 BPM

## 2025-01-06 PROCEDURE — 99213 OFFICE O/P EST LOW 20 MIN: CPT | Mod: TH

## 2025-02-25 NOTE — DISCHARGE NOTE OB - FOUL SMELLING VAGINAL DISCHARGE
Pt missed her appt due to a family emergency. She is now rescheduled on   Tuesday Mar 18, 2025   Appt at 5:00 PM (30 min)        Statement Selected

## 2025-03-12 NOTE — OB PROVIDER DELIVERY SUMMARY - NSANESTHESIALABOR_OBGYN_ALL_OB
Called and spoke with patient and let her know that the urine test was done at the lab, it's under sexual health organism   None

## 2025-07-14 ENCOUNTER — LABORATORY RESULT (OUTPATIENT)
Age: 35
End: 2025-07-14

## 2025-07-14 ENCOUNTER — APPOINTMENT (OUTPATIENT)
Dept: OBGYN | Facility: CLINIC | Age: 35
End: 2025-07-14
Payer: MEDICAID

## 2025-07-14 VITALS — DIASTOLIC BLOOD PRESSURE: 85 MMHG | BODY MASS INDEX: 20.9 KG/M2 | WEIGHT: 118 LBS | SYSTOLIC BLOOD PRESSURE: 117 MMHG

## 2025-07-14 PROCEDURE — 99395 PREV VISIT EST AGE 18-39: CPT

## 2025-07-14 PROCEDURE — 99212 OFFICE O/P EST SF 10 MIN: CPT | Mod: 25

## 2025-07-19 PROBLEM — L91.0 KELOID OF SKIN: Status: ACTIVE | Noted: 2025-07-19

## 2025-07-19 PROBLEM — Z30.09 FAMILY PLANNING ADVICE: Status: ACTIVE | Noted: 2025-07-19

## 2025-08-21 ENCOUNTER — APPOINTMENT (OUTPATIENT)
Dept: OBGYN | Facility: CLINIC | Age: 35
End: 2025-08-21
Payer: MEDICAID

## 2025-08-21 VITALS — SYSTOLIC BLOOD PRESSURE: 122 MMHG | DIASTOLIC BLOOD PRESSURE: 82 MMHG | BODY MASS INDEX: 21.08 KG/M2 | WEIGHT: 119 LBS

## 2025-08-21 DIAGNOSIS — L91.0 HYPERTROPHIC SCAR: ICD-10-CM

## 2025-08-21 PROCEDURE — 99213 OFFICE O/P EST LOW 20 MIN: CPT

## 2025-08-21 RX ORDER — TRIAMCINOLONE ACETONIDE 10 MG/ML
10 INJECTION, SUSPENSION INTRA-ARTICULAR; INTRALESIONAL
Qty: 1 | Refills: 0 | Status: ACTIVE | COMMUNITY
Start: 2025-08-21 | End: 1900-01-01

## 2025-08-26 ENCOUNTER — APPOINTMENT (OUTPATIENT)
Dept: OBGYN | Facility: HOSPITAL | Age: 35
End: 2025-08-26